# Patient Record
Sex: FEMALE | Race: WHITE | NOT HISPANIC OR LATINO | Employment: UNEMPLOYED | URBAN - METROPOLITAN AREA
[De-identification: names, ages, dates, MRNs, and addresses within clinical notes are randomized per-mention and may not be internally consistent; named-entity substitution may affect disease eponyms.]

---

## 2017-05-05 ENCOUNTER — TRANSFERRED RECORDS (OUTPATIENT)
Dept: HEALTH INFORMATION MANAGEMENT | Facility: CLINIC | Age: 22
End: 2017-05-05

## 2018-08-21 ENCOUNTER — TRANSFERRED RECORDS (OUTPATIENT)
Dept: HEALTH INFORMATION MANAGEMENT | Facility: CLINIC | Age: 23
End: 2018-08-21

## 2019-01-18 ENCOUNTER — TRANSFERRED RECORDS (OUTPATIENT)
Dept: HEALTH INFORMATION MANAGEMENT | Facility: CLINIC | Age: 24
End: 2019-01-18
Payer: OTHER GOVERNMENT

## 2019-08-26 ENCOUNTER — COMMUNICATION - HEALTHEAST (OUTPATIENT)
Dept: SCHEDULING | Facility: CLINIC | Age: 24
End: 2019-08-26

## 2020-01-03 ASSESSMENT — ENCOUNTER SYMPTOMS
WEIGHT LOSS: 0
NERVOUS/ANXIOUS: 1
COUGH: 0
DYSPNEA ON EXERTION: 0
FATIGUE: 0
RECTAL PAIN: 0
BOWEL INCONTINENCE: 0
SORE THROAT: 1
INCREASED ENERGY: 1
ALTERED TEMPERATURE REGULATION: 1
WEIGHT GAIN: 0
HALLUCINATIONS: 0
POOR WOUND HEALING: 0
TASTE DISTURBANCE: 1
SINUS PAIN: 0
NECK PAIN: 1
POLYPHAGIA: 0
ABDOMINAL PAIN: 1
MUSCLE WEAKNESS: 0
JAUNDICE: 0
CHILLS: 0
ARTHRALGIAS: 1
DEPRESSION: 1
DIARRHEA: 1
STIFFNESS: 1
DECREASED CONCENTRATION: 1
MYALGIAS: 1
POSTURAL DYSPNEA: 0
POLYDIPSIA: 0
DECREASED LIBIDO: 1
NAIL CHANGES: 0
BLOOD IN STOOL: 0
CONSTIPATION: 0
SMELL DISTURBANCE: 0
HEMOPTYSIS: 0
SPUTUM PRODUCTION: 0
INSOMNIA: 1
JOINT SWELLING: 0
FEVER: 1
NECK MASS: 0
NAUSEA: 1
BLOATING: 0
HOARSE VOICE: 0
COUGH DISTURBING SLEEP: 0
BACK PAIN: 1
PANIC: 0
HOT FLASHES: 0
SINUS CONGESTION: 1
SNORES LOUDLY: 0
DECREASED APPETITE: 0
WHEEZING: 0
TROUBLE SWALLOWING: 0
HEARTBURN: 0
SKIN CHANGES: 0
NIGHT SWEATS: 0
MUSCLE CRAMPS: 1
VOMITING: 0
SHORTNESS OF BREATH: 1

## 2020-01-03 ASSESSMENT — ANXIETY QUESTIONNAIRES
7. FEELING AFRAID AS IF SOMETHING AWFUL MIGHT HAPPEN: SEVERAL DAYS
4. TROUBLE RELAXING: MORE THAN HALF THE DAYS
GAD7 TOTAL SCORE: 13
GAD7 TOTAL SCORE: 13
5. BEING SO RESTLESS THAT IT IS HARD TO SIT STILL: NOT AT ALL
1. FEELING NERVOUS, ANXIOUS, OR ON EDGE: NEARLY EVERY DAY
3. WORRYING TOO MUCH ABOUT DIFFERENT THINGS: NEARLY EVERY DAY
7. FEELING AFRAID AS IF SOMETHING AWFUL MIGHT HAPPEN: SEVERAL DAYS
2. NOT BEING ABLE TO STOP OR CONTROL WORRYING: NEARLY EVERY DAY
6. BECOMING EASILY ANNOYED OR IRRITABLE: SEVERAL DAYS

## 2020-01-07 ENCOUNTER — OFFICE VISIT (OUTPATIENT)
Dept: OBGYN | Facility: CLINIC | Age: 25
End: 2020-01-07
Attending: NURSE PRACTITIONER
Payer: OTHER GOVERNMENT

## 2020-01-07 VITALS
WEIGHT: 98.4 LBS | DIASTOLIC BLOOD PRESSURE: 72 MMHG | BODY MASS INDEX: 18.58 KG/M2 | SYSTOLIC BLOOD PRESSURE: 112 MMHG | HEART RATE: 118 BPM | HEIGHT: 61 IN

## 2020-01-07 DIAGNOSIS — R10.2 VULVOVAGINAL PAIN: Primary | ICD-10-CM

## 2020-01-07 DIAGNOSIS — Z11.3 SCREEN FOR STD (SEXUALLY TRANSMITTED DISEASE): ICD-10-CM

## 2020-01-07 LAB
CLUE CELLS: NEGATIVE
TRICHOMONAS (WET PREP): NEGATIVE
YEAST (WET PREP): NEGATIVE

## 2020-01-07 PROCEDURE — G0463 HOSPITAL OUTPT CLINIC VISIT: HCPCS

## 2020-01-07 PROCEDURE — 87491 CHLMYD TRACH DNA AMP PROBE: CPT | Performed by: NURSE PRACTITIONER

## 2020-01-07 PROCEDURE — 87210 SMEAR WET MOUNT SALINE/INK: CPT | Mod: ZF | Performed by: NURSE PRACTITIONER

## 2020-01-07 PROCEDURE — 87591 N.GONORRHOEAE DNA AMP PROB: CPT | Performed by: NURSE PRACTITIONER

## 2020-01-07 RX ORDER — BUPROPION HYDROCHLORIDE 150 MG/1
TABLET, EXTENDED RELEASE ORAL
COMMUNITY
Start: 2018-06-06 | End: 2024-01-31

## 2020-01-07 RX ORDER — CLOBETASOL PROPIONATE 0.5 MG/G
OINTMENT TOPICAL DAILY
Qty: 30 G | Refills: 0 | Status: ON HOLD | OUTPATIENT
Start: 2020-01-07 | End: 2020-02-04

## 2020-01-07 ASSESSMENT — ANXIETY QUESTIONNAIRES
2. NOT BEING ABLE TO STOP OR CONTROL WORRYING: MORE THAN HALF THE DAYS
5. BEING SO RESTLESS THAT IT IS HARD TO SIT STILL: NOT AT ALL
GAD7 TOTAL SCORE: 7
6. BECOMING EASILY ANNOYED OR IRRITABLE: SEVERAL DAYS
3. WORRYING TOO MUCH ABOUT DIFFERENT THINGS: MORE THAN HALF THE DAYS
7. FEELING AFRAID AS IF SOMETHING AWFUL MIGHT HAPPEN: NOT AT ALL
1. FEELING NERVOUS, ANXIOUS, OR ON EDGE: SEVERAL DAYS

## 2020-01-07 ASSESSMENT — PATIENT HEALTH QUESTIONNAIRE - PHQ9
5. POOR APPETITE OR OVEREATING: SEVERAL DAYS
SUM OF ALL RESPONSES TO PHQ QUESTIONS 1-9: 8

## 2020-01-07 ASSESSMENT — PAIN SCALES - GENERAL: PAINLEVEL: NO PAIN (0)

## 2020-01-07 ASSESSMENT — MIFFLIN-ST. JEOR: SCORE: 1143.72

## 2020-01-07 NOTE — PROGRESS NOTES
"Gaviota Hoover is a 24 yr old female, , who presents to clinic today for evaluation of vaginal pain.     Gaviota has had vaginal pain, believes it was first noticed at age 16 or 17. Primarily occurs with intimacy/ when touched, but occasionally occurred   Randomly, especially when walking. Feels \"dry.\" Describes it as a sharp pain.  Noticed at the opening to the vagina. Not being experienced at this time.   Able to use tampons, able to insert 1 finger but this causes some discomfort. Can work up to insertion of 2. Feels like it doesn't stretch.  In the last 3 months, the random pains have been happening more frequently.     Has had a biospy done by previous provider who thought it may be lichen sclerosus; biopsy was inconclusive per patient report. Another provider thought it was muscular in nature. Recommended pelvic floor PT, but patient declines.     Has not been able to tolerate pelvic exam and pap smear.  Had been seeing Saray Posadas at [facility].     Has had BV and yeast in the past. Denies vaginal itching, abnormal vaginal discharge, vaginal odor, pelvic pain, urinary symptoms, and fever. No hx of STDs.     Menses:  - COCs, takes inactive pills every 3 months   - Hx of Dysmenorrhea and PMS symptoms which are improved with this      Recently graduated college with Criminal justice major; looking for jobs.       Answers for HPI/ROS submitted by the patient on 1/3/2020   CED 7 TOTAL SCORE: 13  General Symptoms: Yes  Skin Symptoms: Yes  HENT Symptoms: Yes  EYE SYMPTOMS: No  HEART SYMPTOMS: No  LUNG SYMPTOMS: Yes  INTESTINAL SYMPTOMS: Yes  URINARY SYMPTOMS: No  GYNECOLOGIC SYMPTOMS: Yes  BREAST SYMPTOMS: No  SKELETAL SYMPTOMS: Yes  BLOOD SYMPTOMS: No  NERVOUS SYSTEM SYMPTOMS: No  MENTAL HEALTH SYMPTOMS: Yes  Fever: Yes  Loss of appetite: No  Weight loss: No  Weight gain: No  Fatigue: No  Night sweats: No  Chills: No  Increased stress: Yes  Excessive hunger: No  Excessive thirst: No  Feeling hot or cold " when others believe the temperature is normal: Yes  Loss of height: No  Post-operative complications: No  Surgical site pain: No  Hallucinations: No  Change in or Loss of Energy: Yes  Hyperactivity: No  Confusion: No  Changes in hair: No  Changes in moles/birth marks: No  Itching: Yes  Rashes: No  Changes in nails: No  Acne: Yes  Hair in places you don't want it: No  Change in facial hair: No  Warts: No  Non-healing sores: No  Scarring: No  Flaking of skin: No  Color changes of hands/feet in cold : No  Sun sensitivity: No  Skin thickening: No  Ear pain: No  Ear discharge: No  Hearing loss: No  Tinnitus: No  Nosebleeds: No  Congestion: Yes  Sinus pain: No  Trouble swallowing: No   Voice hoarseness: No  Mouth sores: No  Sore throat: Yes  Tooth pain: No  Gum tenderness: No  Bleeding gums: No  Change in taste: Yes  Change in sense of smell: No  Dry mouth: No  Hearing aid used: No  Neck lump: No  Cough: No  Sputum or phlegm: No  Coughing up blood: No  Difficulty breating or shortness of breath: Yes  Snoring: No  Wheezing: No  Difficulty breathing on exertion: No  Nighttime Cough: No  Difficulty breathing when lying flat: No  Heart burn or indigestion: No  Nausea: Yes  Vomiting: No  Abdominal pain: Yes  Bloating: No  Constipation: No  Diarrhea: Yes  Blood in stool: No  Black stools: No  Rectal or Anal pain: No  Fecal incontinence: No  Yellowing of skin or eyes: No  Vomit with blood: No  Change in stools: No  Back pain: Yes  Muscle aches: Yes  Neck pain: Yes  Swollen joints: No  Joint pain: Yes  Bone pain: No  Muscle cramps: Yes  Muscle weakness: No  Joint stiffness: Yes  Bone fracture: No  Bleeding or spotting between periods: No  Heavy or painful periods: No  Irregular periods: No  Vaginal discharge: No  Hot flashes: No  Vaginal dryness: No  Genital ulcers: No  Reduced libido: Yes  Painful intercourse: Yes  Difficulty with sexual arousal: No  Post-menopausal bleeding: No  Nervous or Anxious: Yes  Depression: Yes  Trouble  sleeping: Yes  Trouble thinking or concentrating: Yes  Mood changes: Yes  Panic attacks: No    Wet prep: pH=4.4; neg for clue cells, trichomonas, and yeast; neg for amine odor    PLAN:  Pt to schedule consult with ObGyn for removal of remaining hymenal tissue.   May try clobetasol            never

## 2020-01-07 NOTE — LETTER
"2020       RE: Dina Hoover  [address] [facility] [address]     Dear Colleague,    Thank you for referring your patient, Dina Hoover, to the [facility] at [facility]. Please see a copy of my visit note below.    Gaviota Hoover is a 24 yr old female, , who presents to clinic today for evaluation of vaginal pain.     Gaviota has had vaginal pain, believes it was first noticed at age 16 or 17. Primarily occurs with intimacy/ when touched, but occasionally occurred   Randomly, especially when walking. Feels \"dry.\" Describes it as a sharp pain.  Noticed at the opening to the vagina. Not being experienced at this time.   Able to use tampons, able to insert 1 finger but this causes some discomfort. Can work up to insertion of 2. Feels like it doesn't stretch.  In the last 3 months, the random pains have been happening more frequently.     Has had a biospy done by previous provider who thought it may be lichen sclerosus; biopsy was inconclusive per patient report. Another provider thought it was muscular in nature. Recommended pelvic floor PT, but patient declines.     Has not been able to tolerate pelvic exam and pap smear.  Had been seeing Saray Posadas at [facility].     Has had BV and yeast in the past. Denies vaginal itching, abnormal vaginal discharge, vaginal odor, pelvic pain, urinary symptoms, and fever. No hx of STDs.     Menses:  - COCs, takes inactive pills every 3 months   - Hx of Dysmenorrhea and PMS symptoms which are improved with this    Recently graduated college with Criminal justice major; looking for jobs.     Answers for HPI/ROS submitted by the patient on 1/3/2020   CED 7 TOTAL SCORE: 13  General Symptoms: Yes  Skin Symptoms: Yes  HENT Symptoms: Yes  EYE SYMPTOMS: No  HEART SYMPTOMS: No  LUNG SYMPTOMS: Yes  INTESTINAL SYMPTOMS: Yes  URINARY SYMPTOMS: No  GYNECOLOGIC SYMPTOMS: Yes  BREAST SYMPTOMS: No  SKELETAL " SYMPTOMS: Yes  BLOOD SYMPTOMS: No  NERVOUS SYSTEM SYMPTOMS: No  MENTAL HEALTH SYMPTOMS: Yes  Fever: Yes  Loss of appetite: No  Weight loss: No  Weight gain: No  Fatigue: No  Night sweats: No  Chills: No  Increased stress: Yes  Excessive hunger: No  Excessive thirst: No  Feeling hot or cold when others believe the temperature is normal: Yes  Loss of height: No  Post-operative complications: No  Surgical site pain: No  Hallucinations: No  Change in or Loss of Energy: Yes  Hyperactivity: No  Confusion: No  Changes in hair: No  Changes in moles/birth marks: No  Itching: Yes  Rashes: No  Changes in nails: No  Acne: Yes  Hair in places you don't want it: No  Change in facial hair: No  Warts: No  Non-healing sores: No  Scarring: No  Flaking of skin: No  Color changes of hands/feet in cold : No  Sun sensitivity: No  Skin thickening: No  Ear pain: No  Ear discharge: No  Hearing loss: No  Tinnitus: No  Nosebleeds: No  Congestion: Yes  Sinus pain: No  Trouble swallowing: No   Voice hoarseness: No  Mouth sores: No  Sore throat: Yes  Tooth pain: No  Gum tenderness: No  Bleeding gums: No  Change in taste: Yes  Change in sense of smell: No  Dry mouth: No  Hearing aid used: No  Neck lump: No  Cough: No  Sputum or phlegm: No  Coughing up blood: No  Difficulty breating or shortness of breath: Yes  Snoring: No  Wheezing: No  Difficulty breathing on exertion: No  Nighttime Cough: No  Difficulty breathing when lying flat: No  Heart burn or indigestion: No  Nausea: Yes  Vomiting: No  Abdominal pain: Yes  Bloating: No  Constipation: No  Diarrhea: Yes  Blood in stool: No  Black stools: No  Rectal or Anal pain: No  Fecal incontinence: No  Yellowing of skin or eyes: No  Vomit with blood: No  Change in stools: No  Back pain: Yes  Muscle aches: Yes  Neck pain: Yes  Swollen joints: No  Joint pain: Yes  Bone pain: No  Muscle cramps: Yes  Muscle weakness: No  Joint stiffness: Yes  Bone fracture: No  Bleeding or spotting between periods: No  Heavy  or painful periods: No  Irregular periods: No  Vaginal discharge: No  Hot flashes: No  Vaginal dryness: No  Genital ulcers: No  Reduced libido: Yes  Painful intercourse: Yes  Difficulty with sexual arousal: No  Post-menopausal bleeding: No  Nervous or Anxious: Yes  Depression: Yes  Trouble sleeping: Yes  Trouble thinking or concentrating: Yes  Mood changes: Yes  Panic attacks: No    Wet prep: pH=4.4; neg for clue cells, trichomonas, and yeast; neg for amine odor    PLAN:  Pt to schedule consult with ObGyn for removal of remaining hymenal tissue.   May try clobetasol     JASON Hernandez CNP

## 2020-01-08 LAB
C TRACH DNA SPEC QL NAA+PROBE: NEGATIVE
N GONORRHOEA DNA SPEC QL NAA+PROBE: NEGATIVE
SPECIMEN SOURCE: NORMAL
SPECIMEN SOURCE: NORMAL

## 2020-01-28 ENCOUNTER — OFFICE VISIT (OUTPATIENT)
Dept: OBGYN | Facility: CLINIC | Age: 25
End: 2020-01-28
Attending: NURSE PRACTITIONER
Payer: OTHER GOVERNMENT

## 2020-01-28 VITALS
HEART RATE: 85 BPM | DIASTOLIC BLOOD PRESSURE: 84 MMHG | HEIGHT: 61 IN | SYSTOLIC BLOOD PRESSURE: 115 MMHG | WEIGHT: 97.1 LBS | BODY MASS INDEX: 18.33 KG/M2

## 2020-01-28 DIAGNOSIS — E28.39 HYPOESTROGENISM: ICD-10-CM

## 2020-01-28 DIAGNOSIS — R10.2 PELVIC PAIN IN FEMALE: Primary | ICD-10-CM

## 2020-01-28 DIAGNOSIS — Q52.4 HYMEN ABNORMALITY: ICD-10-CM

## 2020-01-28 PROCEDURE — G0463 HOSPITAL OUTPT CLINIC VISIT: HCPCS | Mod: ZF

## 2020-01-28 ASSESSMENT — ANXIETY QUESTIONNAIRES
6. BECOMING EASILY ANNOYED OR IRRITABLE: NOT AT ALL
5. BEING SO RESTLESS THAT IT IS HARD TO SIT STILL: NOT AT ALL
GAD7 TOTAL SCORE: 3
3. WORRYING TOO MUCH ABOUT DIFFERENT THINGS: SEVERAL DAYS
2. NOT BEING ABLE TO STOP OR CONTROL WORRYING: SEVERAL DAYS
7. FEELING AFRAID AS IF SOMETHING AWFUL MIGHT HAPPEN: NOT AT ALL
1. FEELING NERVOUS, ANXIOUS, OR ON EDGE: SEVERAL DAYS

## 2020-01-28 ASSESSMENT — MIFFLIN-ST. JEOR: SCORE: 1137.82

## 2020-01-28 ASSESSMENT — PATIENT HEALTH QUESTIONNAIRE - PHQ9
5. POOR APPETITE OR OVEREATING: NOT AT ALL
SUM OF ALL RESPONSES TO PHQ QUESTIONS 1-9: 7

## 2020-01-28 NOTE — PROGRESS NOTES
"[facility]  Gynecology Consult Visit    SUBJECTIVE    Dina Hoover is a 24 year old G0 who is here for vulvar pain. Gaviota notes that she's had pain since shortly after menarche (age 15; periods became regular and monthly at age 16). Initially, she noted pain and difficulty with insertion of finger/tampon. At age 16, she also began taking OCPs for painful periods and mood swings. Around age 17, she also began having a rubbing or chafing sensation when she walks at the entrance to the vagina. She denies pain inside the vagina, even with insertion of a tampon, only pain at the entrance of the vagina. It is for this reason that she thinks something wrong with her hymen. Gaviota feels that it is physical or a piece of tissue that rubs, or something physically stopping insertion, rather than a muscular problem. At age 19, Gaviota began to think that this pain wasn't normal, but didn't seek care until age 22. [facility] [place] Initially she was treated for BV, but symptoms continued, so she had a vulvar biopsy, and was told she had lichen sclerosus (though the biopsy was negative). She then went to [facility], and there, was unable to tolerate a pelvic exam. At that time, [facility] was referred to pelvic floor physical therapy, but Gaviota didn't think that would help her, so she never went.     On 1/7/2020, she saw RONIT Borden. At that time, the plan was prescribed clobetasol cream and was recommended to see an OBGYN to discuss hymenectomy.     Gaviota continues to take OCPs to improve her periods. Her pain has been significantly improved, though she would still describe her cramps as \"moderate.\" Takes inactive pills q3mths and has about 7d of bleeding. No intermenstrual bleeding. Normal vaginal discharge, no odor, no itching.      PAST MEDICAL HISTORY  Past Medical History:   Diagnosis Date     Back pain      Depression      Generalized anxiety disorder      PAST " SURGICAL HISTORY  Past Surgical History:   Procedure Laterality Date     ENT SURGERY      tooth procedure       MEDICATIONS  Current Outpatient Medications   Medication     buPROPion (WELLBUTRIN SR) 150 MG 12 hr tablet     cetirizine HCl 10 MG CAPS     cholecalciferol (VITAMIN D) 200 units (5 mcg) TABS half-tab     melatonin 5 MG tablet     norethindrone-ethinyl estradiol (ORTHO-NOVUM 1-35 TAB,NORTREL 1-35 TAB) 1-35 MG-MCG tablet     No current facility-administered medications for this visit.        ALLERGIES  Allergies   Allergen Reactions     Cats Itching     Dogs Itching     Grass Itching     Pineapple Itching     OBGYN HISTORY  G0  No history of STDs. Never had a pap smear as she has been unable to tolerate pelvic exams.   Menarche at age 15, periods regular but painful and heavy prior to OCP use. Now managed well with OCPs.    SOCIAL HISTORY  Social History     Tobacco Use     Smoking status: Never Smoker     Smokeless tobacco: Never Used   Substance Use Topics     Alcohol use: Not Currently     Comment: I'll have a drink maybe once or twice a year     Drug use: Never   Recently graduated college with Criminal justice major; looking for jobs. Present at visit today with her girlfriend.    FAMILY HISTORY    Family History   Problem Relation Age of Onset     Other Cancer Maternal Grandfather         Some kind of abdominal cancer was suspected when he . He was a heavy smoker and alcoholic.     Substance Abuse Maternal Grandfather         alcohol     Anxiety Disorder Sister         social anxiety     Anxiety Disorder Sister         CED     Depression Sister      Depression Mother      Genetic Disorder Mother         Anais-Danlos syndrome (hyperflexibility)     Breast Cancer No family hx of      Ovarian Cancer No family hx of      Colon Cancer No family hx of    States that her mom had a radical hysterectomy because of large, bilateral ovarian cysts; surgeon was concerned for cancer at time of procedure;  "pathology ultimately benign.     REVIEW OF SYSTEMS  A 10 point review of systems including Constitutional, Eyes, Respiratory, Cardiovascular, Gastroenterology, Genitourinary, Integumentary, Musculoskeletal, and Psychiatric, were all negative, except for pertinent positives noted in the above HPI.    OBJECTIVE  /84   Pulse 85   Ht 1.549 m (5' 1\")   Wt 44 kg (97 lb 1.6 oz)   BMI 18.35 kg/m      General: Alert, without distress  HEENT: normocephalic, without obvious abnormality   Cardiovascular: extremities warm and well-perfused   Lungs: normal work of breathing   Abdomen: soft, non-tender, non-distended   Pelvic: external genitalia with erythema and excoriation of the labia majora and minora. Very narrow introitus with excess hymenal tissue posteriorly; it is beefy red and atrophic appearing. No weeping/exudate from hymen. Using smallest pediatric roderick speculum, able to gently see anterior lip of cervix, which was normal and without lesions. Using 1 finger, I was able to perform a gentle bimanual examination. Introitus narrowed, pelvic floor very tight, tenderness of introitus/hymen but no tenderness on palpation of anterior/lateral/posterior vagina or cervix. Uterus small, anteverted, nontender adnexae without masses. Normal anus/perineum.   Extremities: normal    LABS:     Component      Latest Ref Rng & Units 1/7/2020   Trichomonas Wet Prep      Negative Negative   Clue cells      Negative Negative   Yeast Wet Prep      Negative Negative   Specimen Descrip       Vagina   N Gonorrhea PCR      NEG:Negative Negative   Specimen Description       Vagina   Chlamydia Trachomatis PCR      NEG:Negative Negative     IMAGING:    ASSESSMENT  Dina Hoover is a 24 year old G0 who is here with vaginal pain.    PLAN  Gaviota and I discussed that I think her pain has several causes:    -Gaviota's exam today is without evidence of a uterine/mullerian anomaly, but I discussed with Gaviota that her pain precludes a " thorough exam of the vagina. I recommended an abdominal pelvic ultrasound to further exclude this, as this would drastically change our management of her pain. She is in agreement.  -I believe her long-standing OCP use has led to some vaginal atrophy. I discussed that we could either switch methods to something non-systemic, like a Mirena IUD, or she could use topical estrogen cream. Gaviota would like to avoid an IUD if possible as she is worried about infertility (though she understands malposition/perforation is an unlikely outcome), so would like to proceed with vaginal estrogen cream. Reviewed how/where to apply and how often.  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 Discussed performing an exam under anesthesia, pap smear, and hymenectomy. Gaviota is in agreement. Discussed risks include incomplete resolution or worsening of pain, narrowing of the vagina after the procedure. To be scheduled at her earliest convenience.  -Finally, discussed that Gaviota has a very hypertonic pelvic floor, likely because of her long-standing pain. I discussed that because of this, hymenectomy and vaginal estrogen are unlikely to completely resolve her symptoms, and that I think pelvic floor PT is likely to help, possibly also with use of vaginal dilators. Gaviota is cautiously in agreement. We will continue to discuss in the post-operative period as she recovers from surgery.     RTC for 2 week postop.    Evie Ludwig MD, MSCI    [facility]/OB[facility]N

## 2020-01-28 NOTE — LETTER
1/28/2020       RE: Dina Hoover  [address] [place] [id number]     Dear Colleague,    Thank you for referring your patient, Dina Hoover, to the [facility] at [facility]. Please see a copy of my visit note below.    [facility]  Gynecology Consult Visit    SUBJECTIVE    Dina Hoover is a 24 year old G0 who is here for vulvar pain. Gaviota notes that she's had pain since shortly after menarche (age 15; periods became regular and monthly at age 16). Initially, she noted pain and difficulty with insertion of finger/tampon. At age 16, she also began taking OCPs for painful periods and mood swings. Around age 17, she also began having a rubbing or chafing sensation when she walks at the entrance to the vagina. She denies pain inside the vagina, even with insertion of a tampon, only pain at the entrance of the vagina. It is for this reason that she thinks something wrong with her hymen. Gaviota feels that it is physical or a piece of tissue that rubs, or something physically stopping insertion, rather than a muscular problem. At age 19, Gaviota began to think that this pain wasn't normal, but didn't seek care until age 22. [facility] [place] Initially she was treated for BV, but symptoms continued, so she had a vulvar biopsy, and was told she had lichen sclerosus (though the biopsy was negative). She then went to [facility], and there, was unable to tolerate a pelvic exam. At that time, [facility] was referred to pelvic floor physical therapy, but Gaviota didn't think that would help her, so she never went.     On 1/7/2020, she saw RONIT Borden. At that time, the plan was prescribed clobetasol cream and was recommended to see an OBGYN to discuss hymenectomy.     Gaviota continues to take OCPs to improve her periods. Her pain has been significantly improved, though she would still describe her  "cramps as \"moderate.\" Takes inactive pills q3mths and has about 7d of bleeding. No intermenstrual bleeding. Normal vaginal discharge, no odor, no itching.      PAST MEDICAL HISTORY  Past Medical History:   Diagnosis Date     Back pain      Depression      Generalized anxiety disorder      PAST SURGICAL HISTORY  Past Surgical History:   Procedure Laterality Date     ENT SURGERY      tooth procedure       MEDICATIONS  Current Outpatient Medications   Medication     buPROPion (WELLBUTRIN SR) 150 MG 12 hr tablet     cetirizine HCl 10 MG CAPS     cholecalciferol (VITAMIN D) 200 units (5 mcg) TABS half-tab     melatonin 5 MG tablet     norethindrone-ethinyl estradiol (ORTHO-NOVUM 1-35 TAB,NORTREL 1-35 TAB) 1-35 MG-MCG tablet     No current facility-administered medications for this visit.        ALLERGIES  Allergies   Allergen Reactions     Cats Itching     Dogs Itching     Grass Itching     Pineapple Itching     OBGYN HISTORY  G0  No history of STDs. Never had a pap smear as she has been unable to tolerate pelvic exams.   Menarche at age 15, periods regular but painful and heavy prior to OCP use. Now managed well with OCPs.    SOCIAL HISTORY  Social History     Tobacco Use     Smoking status: Never Smoker     Smokeless tobacco: Never Used   Substance Use Topics     Alcohol use: Not Currently     Comment: I'll have a drink maybe once or twice a year     Drug use: Never   Recently graduated college with Criminal justice major; looking for jobs. Present at visit today with her girlfriend.    FAMILY HISTORY    Family History   Problem Relation Age of Onset     Other Cancer Maternal Grandfather         Some kind of abdominal cancer was suspected when he . He was a heavy smoker and alcoholic.     Substance Abuse Maternal Grandfather         alcohol     Anxiety Disorder Sister         social anxiety     Anxiety Disorder Sister         CED     Depression Sister      Depression Mother      Genetic Disorder Mother         " "Anais-Danlos syndrome (hyperflexibility)     Breast Cancer No family hx of      Ovarian Cancer No family hx of      Colon Cancer No family hx of    States that her mom had a radical hysterectomy because of large, bilateral ovarian cysts; surgeon was concerned for cancer at time of procedure; pathology ultimately benign.     REVIEW OF SYSTEMS  A 10 point review of systems including Constitutional, Eyes, Respiratory, Cardiovascular, Gastroenterology, Genitourinary, Integumentary, Musculoskeletal, and Psychiatric, were all negative, except for pertinent positives noted in the above HPI.    OBJECTIVE  /84   Pulse 85   Ht 1.549 m (5' 1\")   Wt 44 kg (97 lb 1.6 oz)   BMI 18.35 kg/m       General: Alert, without distress  HEENT: normocephalic, without obvious abnormality   Cardiovascular: extremities warm and well-perfused   Lungs: normal work of breathing   Abdomen: soft, non-tender, non-distended   Pelvic: external genitalia with erythema and excoriation of the labia majora and minora. Very narrow introitus with excess hymenal tissue posteriorly; it is beefy red and atrophic appearing. No weeping/exudate from hymen. Using smallest pediatric roderick speculum, able to gently see anterior lip of cervix, which was normal and without lesions. Using 1 finger, I was able to perform a gentle bimanual examination. Introitus narrowed, pelvic floor very tight, tenderness of introitus/hymen but no tenderness on palpation of anterior/lateral/posterior vagina or cervix. Uterus small, anteverted, nontender adnexae without masses. Normal anus/perineum.   Extremities: normal    LABS:     Component      Latest Ref Rng & Units 1/7/2020   Trichomonas Wet Prep      Negative Negative   Clue cells      Negative Negative   Yeast Wet Prep      Negative Negative   Specimen Descrip       Vagina   N Gonorrhea PCR      NEG:Negative Negative   Specimen Description       Vagina   Chlamydia Trachomatis PCR      NEG:Negative Negative "     IMAGING:    ASSESSMENT  Dina Hoover is a 24 year old G0 who is here with vaginal pain.    PLAN  Gaviota and I discussed that I think her pain has several causes:    -Gaviota's exam today is without evidence of a uterine/mullerian anomaly, but I discussed with Gaviota that her pain precludes a thorough exam of the vagina. I recommended an abdominal pelvic ultrasound to further exclude this, as this would drastically change our management of her pain. She is in agreement.  -I believe her long-standing OCP use has led to some vaginal atrophy. I discussed that we could either switch methods to something non-systemic, like a Mirena IUD, or she could use topical estrogen cream. Gaviota would like to avoid an IUD if possible as she is worried about infertility (though she understands malposition/perforation is an unlikely outcome), so would like to proceed with vaginal estrogen cream. Reviewed how/where to apply and how often.  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 Discussed performing an exam under anesthesia, pap smear, and hymenectomy. Gaviota is in agreement. Discussed risks include incomplete resolution or worsening of pain, narrowing of the vagina after the procedure. To be scheduled at her earliest convenience.  -Finally, discussed that Gaviota has a very hypertonic pelvic floor, likely because of her long-standing pain. I discussed that because of this, hymenectomy and vaginal estrogen are unlikely to completely resolve her symptoms, and that I think pelvic floor PT is likely to help, possibly also with use of vaginal dilators. Gaviota is cautiously in agreement. We will continue to discuss in the post-operative period as she recovers from surgery.     RTC for 2 week postop.    Evie Ludwig MD, MSCI    [facility]

## 2020-01-29 ENCOUNTER — TELEPHONE (OUTPATIENT)
Dept: OBGYN | Facility: CLINIC | Age: 25
End: 2020-01-29

## 2020-01-29 PROBLEM — Q52.4 HYMEN ABNORMALITY: Status: ACTIVE | Noted: 2020-01-29

## 2020-01-29 PROBLEM — E28.39 HYPOESTROGENISM: Status: ACTIVE | Noted: 2020-01-29

## 2020-01-29 PROBLEM — R10.2 PELVIC PAIN IN FEMALE: Status: ACTIVE | Noted: 2020-01-29

## 2020-01-29 ASSESSMENT — ANXIETY QUESTIONNAIRES: GAD7 TOTAL SCORE: 3

## 2020-01-29 NOTE — TELEPHONE ENCOUNTER
Confirmed surgery date, time and location, 2/4/20 with arrival time at 11:00a.m with nothing to eat eight hours before scheduled surgery time and clear liquids up to two hours before scheduled surgery time, informed patient that a surgery map and letter will be at the  for appt scheduled 1/29/20.     to complete the following fields:            CHECKLIST     Google Calendar : Yes     Resident notified: Not Applicable     Clinic schedule blocked:  Not Applicable    Patient notified:Yes      Pre op information sent: Yes     Given to patient over the phone.Yes    Comments:

## 2020-01-30 ENCOUNTER — TELEPHONE (OUTPATIENT)
Dept: OBGYN | Facility: CLINIC | Age: 25
End: 2020-01-30

## 2020-01-30 ENCOUNTER — ANCILLARY PROCEDURE (OUTPATIENT)
Dept: ULTRASOUND IMAGING | Facility: CLINIC | Age: 25
End: 2020-01-30
Attending: OBSTETRICS & GYNECOLOGY
Payer: OTHER GOVERNMENT

## 2020-01-30 DIAGNOSIS — R10.2 PELVIC PAIN IN FEMALE: ICD-10-CM

## 2020-01-30 DIAGNOSIS — E28.39 HYPOESTROGENISM: ICD-10-CM

## 2020-01-30 PROCEDURE — 76856 US EXAM PELVIC COMPLETE: CPT

## 2020-01-30 NOTE — TELEPHONE ENCOUNTER
----- Message from Tanna Adair RN sent at 1/29/2020  5:08 PM CST -----  Regarding: [facility]   Pt requesting to [facility]

## 2020-02-04 ENCOUNTER — ANESTHESIA (OUTPATIENT)
Dept: SURGERY | Facility: CLINIC | Age: 25
End: 2020-02-04
Payer: OTHER GOVERNMENT

## 2020-02-04 ENCOUNTER — ANESTHESIA EVENT (OUTPATIENT)
Dept: SURGERY | Facility: CLINIC | Age: 25
End: 2020-02-04
Payer: OTHER GOVERNMENT

## 2020-02-04 ENCOUNTER — HOSPITAL ENCOUNTER (OUTPATIENT)
Facility: CLINIC | Age: 25
Discharge: HOME | End: 2020-02-04
Attending: OBSTETRICS & GYNECOLOGY | Admitting: OBSTETRICS & GYNECOLOGY
Payer: OTHER GOVERNMENT

## 2020-02-04 VITALS
DIASTOLIC BLOOD PRESSURE: 83 MMHG | BODY MASS INDEX: 18.91 KG/M2 | OXYGEN SATURATION: 100 % | SYSTOLIC BLOOD PRESSURE: 120 MMHG | TEMPERATURE: 98.6 F | HEIGHT: 60 IN | RESPIRATION RATE: 20 BRPM | HEART RATE: 123 BPM | WEIGHT: 96.34 LBS

## 2020-02-04 DIAGNOSIS — R10.2 VULVOVAGINAL PAIN: ICD-10-CM

## 2020-02-04 DIAGNOSIS — E28.39 HYPOESTROGENISM: ICD-10-CM

## 2020-02-04 DIAGNOSIS — Q52.4 HYMEN ABNORMALITY: ICD-10-CM

## 2020-02-04 DIAGNOSIS — R10.2 PELVIC PAIN IN FEMALE: ICD-10-CM

## 2020-02-04 LAB
B-HCG SERPL-ACNC: <1 IU/L (ref 0–5)
GLUCOSE BLDC GLUCOMTR-MCNC: 83 MG/DL (ref 70–99)
HCG UR QL: NEGATIVE

## 2020-02-04 PROCEDURE — 25000132 ZZH RX MED GY IP 250 OP 250 PS 637: Performed by: STUDENT IN AN ORGANIZED HEALTH CARE EDUCATION/TRAINING PROGRAM

## 2020-02-04 PROCEDURE — 25000128 H RX IP 250 OP 636: Performed by: NURSE ANESTHETIST, CERTIFIED REGISTERED

## 2020-02-04 PROCEDURE — 71000027 ZZH RECOVERY PHASE 2 EACH 15 MINS: Performed by: OBSTETRICS & GYNECOLOGY

## 2020-02-04 PROCEDURE — G0123 SCREEN CERV/VAG THIN LAYER: HCPCS | Performed by: OBSTETRICS & GYNECOLOGY

## 2020-02-04 PROCEDURE — 36000053 ZZH SURGERY LEVEL 2 EA 15 ADDTL MIN - UMMC: Performed by: OBSTETRICS & GYNECOLOGY

## 2020-02-04 PROCEDURE — 82962 GLUCOSE BLOOD TEST: CPT

## 2020-02-04 PROCEDURE — 88305 TISSUE EXAM BY PATHOLOGIST: CPT | Mod: 26 | Performed by: OBSTETRICS & GYNECOLOGY

## 2020-02-04 PROCEDURE — 36000051 ZZH SURGERY LEVEL 2 1ST 30 MIN - UMMC: Performed by: OBSTETRICS & GYNECOLOGY

## 2020-02-04 PROCEDURE — 84702 CHORIONIC GONADOTROPIN TEST: CPT | Performed by: STUDENT IN AN ORGANIZED HEALTH CARE EDUCATION/TRAINING PROGRAM

## 2020-02-04 PROCEDURE — 37000009 ZZH ANESTHESIA TECHNICAL FEE, EACH ADDTL 15 MIN: Performed by: OBSTETRICS & GYNECOLOGY

## 2020-02-04 PROCEDURE — 40000170 ZZH STATISTIC PRE-PROCEDURE ASSESSMENT II: Performed by: OBSTETRICS & GYNECOLOGY

## 2020-02-04 PROCEDURE — 25000125 ZZHC RX 250: Performed by: NURSE ANESTHETIST, CERTIFIED REGISTERED

## 2020-02-04 PROCEDURE — 27210794 ZZH OR GENERAL SUPPLY STERILE: Performed by: OBSTETRICS & GYNECOLOGY

## 2020-02-04 PROCEDURE — 88305 TISSUE EXAM BY PATHOLOGIST: CPT | Performed by: OBSTETRICS & GYNECOLOGY

## 2020-02-04 PROCEDURE — 81025 URINE PREGNANCY TEST: CPT | Performed by: ANESTHESIOLOGY

## 2020-02-04 PROCEDURE — 37000008 ZZH ANESTHESIA TECHNICAL FEE, 1ST 30 MIN: Performed by: OBSTETRICS & GYNECOLOGY

## 2020-02-04 PROCEDURE — 36415 COLL VENOUS BLD VENIPUNCTURE: CPT | Performed by: STUDENT IN AN ORGANIZED HEALTH CARE EDUCATION/TRAINING PROGRAM

## 2020-02-04 PROCEDURE — 25800030 ZZH RX IP 258 OP 636: Performed by: ANESTHESIOLOGY

## 2020-02-04 RX ORDER — SODIUM CHLORIDE, SODIUM LACTATE, POTASSIUM CHLORIDE, CALCIUM CHLORIDE 600; 310; 30; 20 MG/100ML; MG/100ML; MG/100ML; MG/100ML
INJECTION, SOLUTION INTRAVENOUS CONTINUOUS
Status: DISCONTINUED | OUTPATIENT
Start: 2020-02-04 | End: 2020-02-04 | Stop reason: HOSPADM

## 2020-02-04 RX ORDER — ONDANSETRON 2 MG/ML
INJECTION INTRAMUSCULAR; INTRAVENOUS PRN
Status: DISCONTINUED | OUTPATIENT
Start: 2020-02-04 | End: 2020-02-04

## 2020-02-04 RX ORDER — LIDOCAINE 40 MG/G
CREAM TOPICAL
Status: DISCONTINUED | OUTPATIENT
Start: 2020-02-04 | End: 2020-02-04 | Stop reason: HOSPADM

## 2020-02-04 RX ORDER — PROPOFOL 10 MG/ML
INJECTION, EMULSION INTRAVENOUS CONTINUOUS PRN
Status: DISCONTINUED | OUTPATIENT
Start: 2020-02-04 | End: 2020-02-04

## 2020-02-04 RX ORDER — FENTANYL CITRATE 50 UG/ML
INJECTION, SOLUTION INTRAMUSCULAR; INTRAVENOUS PRN
Status: DISCONTINUED | OUTPATIENT
Start: 2020-02-04 | End: 2020-02-04

## 2020-02-04 RX ORDER — DEXAMETHASONE SODIUM PHOSPHATE 4 MG/ML
INJECTION, SOLUTION INTRA-ARTICULAR; INTRALESIONAL; INTRAMUSCULAR; INTRAVENOUS; SOFT TISSUE PRN
Status: DISCONTINUED | OUTPATIENT
Start: 2020-02-04 | End: 2020-02-04

## 2020-02-04 RX ORDER — LIDOCAINE HYDROCHLORIDE 20 MG/ML
INJECTION, SOLUTION INFILTRATION; PERINEURAL PRN
Status: DISCONTINUED | OUTPATIENT
Start: 2020-02-04 | End: 2020-02-04

## 2020-02-04 RX ORDER — PHENAZOPYRIDINE HYDROCHLORIDE 200 MG/1
200 TABLET, FILM COATED ORAL ONCE
Status: DISCONTINUED | OUTPATIENT
Start: 2020-02-04 | End: 2020-02-04

## 2020-02-04 RX ORDER — IBUPROFEN 600 MG/1
600 TABLET, FILM COATED ORAL
Status: COMPLETED | OUTPATIENT
Start: 2020-02-04 | End: 2020-02-04

## 2020-02-04 RX ADMIN — FENTANYL CITRATE 50 MCG: 50 INJECTION, SOLUTION INTRAMUSCULAR; INTRAVENOUS at 13:37

## 2020-02-04 RX ADMIN — SODIUM CHLORIDE, POTASSIUM CHLORIDE, SODIUM LACTATE AND CALCIUM CHLORIDE: 600; 310; 30; 20 INJECTION, SOLUTION INTRAVENOUS at 13:31

## 2020-02-04 RX ADMIN — ONDANSETRON 4 MG: 2 INJECTION INTRAMUSCULAR; INTRAVENOUS at 13:40

## 2020-02-04 RX ADMIN — FENTANYL CITRATE 50 MCG: 50 INJECTION, SOLUTION INTRAMUSCULAR; INTRAVENOUS at 13:33

## 2020-02-04 RX ADMIN — LIDOCAINE HYDROCHLORIDE 50 MG: 20 INJECTION, SOLUTION INFILTRATION; PERINEURAL at 13:35

## 2020-02-04 RX ADMIN — MIDAZOLAM 2 MG: 1 INJECTION INTRAMUSCULAR; INTRAVENOUS at 13:31

## 2020-02-04 RX ADMIN — PROPOFOL 150 MCG/KG/MIN: 10 INJECTION, EMULSION INTRAVENOUS at 13:33

## 2020-02-04 RX ADMIN — IBUPROFEN 600 MG: 600 TABLET ORAL at 15:15

## 2020-02-04 RX ADMIN — DEXAMETHASONE SODIUM PHOSPHATE 8 MG: 4 INJECTION, SOLUTION INTRAMUSCULAR; INTRAVENOUS at 13:40

## 2020-02-04 ASSESSMENT — MIFFLIN-ST. JEOR: SCORE: 1118.5

## 2020-02-04 NOTE — PROGRESS NOTES
Reviewed briefly risks/benefits of procedure with Gaviota. Questions answered. Plan for hymenectomy, exam under anesthesia, pap smear.     Evie Ludwig MD, MSCI    [facility]/OBGYN

## 2020-02-04 NOTE — ANESTHESIA POSTPROCEDURE EVALUATION
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       Anesthesia Type:  Not documented  MAC    Reportable Event: NO     PAIN: Uncomplicated   Sign Out status: Comfortable, Well controlled pain     PONV: No PONV   Sign Out status:  No Nausea or Vomiting     Neuro/Psych: Uneventful perioperative course   Sign Out Status: Preoperative baseline; Age appropriate mentation     Airway/Resp.: Uneventful perioperative course   Sign Out Status: Non labored breathing, age appropriate RR; Resp. Status within EXPECTED Parameters     CV: Uneventful perioperative course   Sign Out status: Appropriate BP and perfusion indices; Appropriate HR/Rhythm     Disposition:   Sign Out in:  PACU  Disposition:  Phase II; Home  Recovery Course: Uneventful  Follow-Up: Not required           Last Anesthesia Record Vitals:  CRNA VITALS  2020 1334 - 2020 1434      2020             Pulse:  82    SpO2:  100 %          Last PACU Vitals:  Vitals Value Taken Time   /72 2020  2:06 PM   Temp 36.9  C (98.4  F) 2020  2:06 PM   Pulse 92 2020  2:06 PM   Resp 20 2020  2:06 PM   SpO2 100 % 2020  2:06 PM   Temp src     NIBP 96/61 2020  2:01 PM   Pulse 82 2020  2:03 PM   SpO2 100 % 2020  2:03 PM   Resp     Temp     Ht Rate 90 2020  2:01 PM   Temp 2 37  C (98.6  F) 2020  2:00 PM         Electronically Signed By: Tacho Alcala DO, 2020, 2:48 PM

## 2020-02-04 NOTE — BRIEF OP NOTE
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 EUA: narrow vagina with posterior hymenal remant, small cervix, small mispositioned uterus  2. Hemostasis at end of case  Complications: None.    Debbi Abraham MD  Obstetrics and Gynecology, PGY-1  02/04/20. 2:05 PM

## 2020-02-04 NOTE — H&P
"[facility]  Gynecology Consult Visit    SUBJECTIVE    Dina Hoover is a 24 year old G0 who is here for vulvar pain. Gaviota notes that she's had pain since shortly after menarche (age 15; periods became regular and monthly at age 16). Initially, she noted pain and difficulty with insertion of finger/tampon. At age 16, she also began taking OCPs for painful periods and mood swings. Around age 17, she also began having a rubbing or chafing sensation when she walks at the entrance to the vagina. She denies pain inside the vagina, even with insertion of a tampon, only pain at the entrance of the vagina. It is for this reason that she thinks something wrong with her hymen. Gaviota feels that it is physical or a piece of tissue that rubs, or something physically stopping insertion, rather than a muscular problem. At age 19, Gaviota began to think that this pain wasn't normal, but didn't seek care until age 22. [facility] [place] Initially she was treated for BV, but symptoms continued, so she had a vulvar biopsy, and was told she had lichen sclerosus (though the biopsy was negative). She then went to [facility], and there, was unable to tolerate a pelvic exam. At that time, [facility] was referred to pelvic floor physical therapy, but Gaviota didn't think that would help her, so she never went.      On 1/7/2020, she saw RONIT Borden. At that time, the plan was prescribed clobetasol cream and was recommended to see an OBGYN to discuss hymenectomy.      Gaviota continues to take OCPs to improve her periods. Her pain has been significantly improved, though she would still describe her cramps as \"moderate.\" Takes inactive pills q3mths and has about 7d of bleeding. No intermenstrual bleeding. Normal vaginal discharge, no odor, no itching.    PAST MEDICAL HISTORY  Past Medical History:   Diagnosis Date     Back pain      Depression      Generalized anxiety disorder  "       MEDICATIONS    Medications Prior to Admission   Medication Sig Dispense Refill Last Dose     buPROPion (WELLBUTRIN SR) 150 MG 12 hr tablet    2020 at Unknown time     cetirizine HCl 10 MG CAPS    2020 at Unknown time     cholecalciferol (VITAMIN D) 200 units (5 mcg) TABS half-tab    2020 at Unknown time     [] clobetasol (TEMOVATE) 0.05 % external ointment Apply topically daily for 14 days 30 g 0      conjugated estrogens (PREMARIN) 0.625 MG/GM vaginal cream Place 1 g vaginally twice a week 50 g 1      melatonin 5 MG tablet    Taking     norethindrone-ethinyl estradiol (ORTHO-NOVUM 1-35 TAB,NORTREL 1-35 TAB) 1-35 MG-MCG tablet    Taking       ALLERGIES  Allergies   Allergen Reactions     Cats Itching     Dogs Itching     Grass Itching     Pineapple Itching       SOCIAL HISTORY  Social History     Tobacco Use     Smoking status: Never Smoker     Smokeless tobacco: Never Used   Substance Use Topics     Alcohol use: Not Currently     Comment: I'll have a drink maybe once or twice a year     Drug use: Never     OBGYN HISTORY  G0  No history of STDs. Never had a pap smear as she has been unable to tolerate pelvic exams.   Menarche at age 15, periods regular but painful and heavy prior to OCP use. Now managed well with OCPs.    FAMILY HISTORY  Family History   Problem Relation Age of Onset     Other Cancer Maternal Grandfather         Some kind of abdominal cancer was suspected when he . He was a heavy smoker and alcoholic.     Substance Abuse Maternal Grandfather         alcohol     Anxiety Disorder Sister         social anxiety     Anxiety Disorder Sister         CED     Depression Sister      Depression Mother      Genetic Disorder Mother         Anais-Danlos syndrome (hyperflexibility)     Breast Cancer No family hx of      Ovarian Cancer No family hx of      Colon Cancer No family hx of    States that her mom had a radical hysterectomy because of large, bilateral ovarian cysts;  surgeon was concerned for cancer at time of procedure; pathology ultimately benign.     REVIEW OF SYSTEMS  A 10 point review of systems including Constitutional, Eyes, Respiratory, Cardiovascular, Gastroenterology, Genitourinary, Integumentary, Musculoskeletal, and Psychiatric, were all negative, except for pertinent positives noted in the above HPI.    OBJECTIVE  There were no vitals taken for this visit.    General: Alert, without distress  HEENT: normocephalic, without obvious abnormality   Cardiovascular: regular rate and rhythm, no murmurs/rubs/gallops   Lungs: clear to auscultation bilaterally   Abdomen: soft, non-tender, non-distended, normal bowel sounds   Pelvic: deferred   Extremities: normal    LABS:   Component      Latest Ref Rng & Units 1/7/2020   Trichomonas Wet Prep      Negative Negative   Clue cells      Negative Negative   Yeast Wet Prep      Negative Negative   Specimen Descrip       Vagina   N Gonorrhea PCR      NEG:Negative Negative   Specimen Description       Vagina   Chlamydia Trachomatis PCR      NEG:Negative Negative       IMAGING:  Pelvic Ultrasound:   24 year old female with LMP 11/20/2019 presents for gynecologic ultrasound indicated by pelvic pain, rule out mullerian abnormality.  This study was done transabdominally.     Uterine findings:              Presence: Visible Size: Normal 3.3x4.0x2.5 cm.  Endometrium = 6.1 mm.              Cx length = 38.4 mm.                 Flexion:  Anteverted    Position: Midline          Margins: Smooth         Shape: Normal              Contour: Regular        Texture: Homogeneous          Cavity: Normal              Masses: Normal     Pelvic findings:               Right Adnexa: Normal              Left Adnexa: Normal              Bladder:  Normal                                                           Cul - de - sac fluid: None     Ovarian follicles:              Right ovary:  2.1x2.4x1.0cm.              Small follicles              Size(s):  Less  than 5mm              Left ovary:  1.7x1.3x0.91cm.              Small follicles              Size(s):  Less than 5mm  Comments:  normal uterine architecture but small overall size for woman of reproductive age.   RENZO Macdonald MD    ASSESSMENT  Dina Hoover is a 24 year old G0 who is here with vaginal pain.     PLAN  Gaviota and I discussed that I think her pain has several causes:     -Gaviota's exam today is without evidence of a uterine/mullerian anomaly, but I discussed with Gaviota that her pain precludes a thorough exam of the vagina. I recommended an abdominal pelvic ultrasound to further exclude this, as this would drastically change our management of her pain. She is in agreement.  -I believe her long-standing OCP use has led to some vaginal atrophy. I discussed that we could either switch methods to something non-systemic, like a Mirena IUD, or she could use topical estrogen cream. Gaviota would like to avoid an IUD if possible as she is worried about infertility (though she understands malposition/perforation is an unlikely outcome), so would like to proceed with vaginal estrogen cream. Reviewed how/where to apply and how often.  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 Discussed performing an exam under anesthesia, pap smear, and hymenectomy. Gaviota is in agreement. Discussed risks include incomplete resolution or worsening of pain, narrowing of the vagina after the procedure. To be scheduled at her earliest convenience.  -Finally, discussed that Gaviota has a very hypertonic pelvic floor, likely because of her long-standing pain. I discussed that because of this, hymenectomy and vaginal estrogen are unlikely to completely resolve her symptoms, and that I think pelvic floor PT is likely to help, possibly also with use of vaginal dilators. Gaviota is cautiously in agreement. We will continue to discuss in the post-operative period  as she recovers from surgery.      Evie Ludwig MD, MSCI    [facility]/OB[facility]N

## 2020-02-04 NOTE — ANESTHESIA CARE TRANSFER NOTE
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    Anesthesia Type:   No value filed.     Note:  Airway :Room Air  Patient transferred to:Phase II  Comments: At end of procedure, spontaneous respirations, patient alert to voice, able to follow commands. Patient breathing room air to PACU. SpO2, NiBP, and EKG monitors and alarms on and functioning, Ignacio Hugger warmer connected to patient gown, report on patient's clinical status given to PACU RN, RN questions answered.Handoff Report: Identifed the Patient, Identified the Reponsible Provider, Reviewed the pertinent medical history, Discussed the surgical course, Reviewed Intra-OP anesthesia mangement and issues during anesthesia, Set expectations for post-procedure period and Allowed opportunity for questions and acknowledgement of understanding      Vitals: (Last set prior to Anesthesia Care Transfer)    CRNA VITALS  2/4/2020 1334 - 2/4/2020 1409      2/4/2020             Pulse:  82    SpO2:  100 %                Electronically Signed By: JASON White CRNA  February 4, 2020  2:09 PM

## 2020-02-04 NOTE — DISCHARGE INSTRUCTIONS
[facility]   Adult Discharge Orders & Instructions     For 24 hours after surgery    1. Get plenty of rest.  A responsible adult must stay with you for at least 24 hours after you leave the hospital.   2. Do not drive or use heavy equipment.  If you have weakness or tingling, don't drive or use heavy equipment until this feeling goes away.  3. Do not drink alcohol.  4. Avoid strenuous or risky activities.  Ask for help when climbing stairs.   5. You may feel lightheaded.  IF so, sit for a few minutes before standing.  Have someone help you get up.   6. If you have nausea (feel sick to your stomach): Drink only clear liquids such as apple juice, ginger ale, broth or 7-Up.  Rest may also help.  Be sure to drink enough fluids.  Move to a regular diet as you feel able.  7. You may have a slight fever. Call the doctor if your fever is over 100 F (37.7 C) (taken under the tongue) or lasts longer than 24 hours.  8. You may have a dry mouth, a sore throat, muscle aches or trouble sleeping.  These should go away after 24 hours.  9. Do not make important or legal decisions.   Call your doctor for any of the followin.  Signs of infection (fever, growing tenderness at the surgery site, a large amount of drainage or bleeding, severe pain, foul-smelling drainage, redness, swelling).    2. It has been over 8 to 10 hours since surgery and you are still not able to urinate (pass water).    3.  Headache for over 24 hours.    4.  Numbness, tingling or weakness the day after surgery (if you had spinal anesthesia).  To contact a doctor, call ________________________________________

## 2020-02-04 NOTE — OP NOTE
[facility]  Full Operative Note    Date of Service: 2020    Surgeon: Evie Ludwig MD  Assistants: Debbi Abraham MD, PGY-1  Preop Dx: Hypoestrogenism, pelvic pain, hymenal abnormality   Postop Dx: Same as above, s/p procedure below  Procedure: Exam under anesthesia, pap smear, hymenectomy    Anesthesia: MAC  EBL:  20 cc  Specimens: Pap smear, endocervical brushings, hymenal remnant  Complications: None apparent    Indications: Ms. Dina Hoover is a 24 year old  with a history of pelvic pain and hymenal abnormality. An US was without evidence of mullerian abnormality. An exam under anesthesia with pap smear and hymenectomy was recommended. The risks and benefits were discussed with the patient, and she agreed to proceed.    Findings:   1. EUA with narrow vagina, posterior hymenal hypertrophy. Small, mobile, ante-mid positioned uterus.  2. Nulliparous cervix, transformation zone fully visualized.  3. Hemostasis at end of case.      Procedure Details:  The patient was brought to the OR where adequate MAC was administered.  Exam under anesthesia revealed the findings noted above.  A sterile speculum was placed. A pap test and endocervical brushing were obtained in the usual fashion and sent to pathology. The speculum was removed.   The vagina and vulva were then cleansed with betadine x3. The posterior hymenal remnant was grasped using a pickup with teeth. A 2.0x0.5cm section of the posterior hymen was removed using the scalpel from approximately 4-8 o clock. 4 interrupted stitches using 3-0 Vicryl for suture were placed along the remaining hymenal edge. Hemostasis was achieved.      The sponge, needle, and instrument counts were correct.  The patient tolerated the procedure well and was transferred to recovery in stable condition.  Dr. Ludwig was present and scrubbed for the entirety of the procedure.    Debbi Abraham MD  OB/GYN, PGY-1  2020, 2:12 PM

## 2020-02-04 NOTE — ANESTHESIA PREPROCEDURE EVALUATION
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 Rate: Normal     Resp. Effort: Normal      CV: Rhythm: Regular  Rate: Age appropriate  Heart: Normal Sounds  Edema: None   Comments:      Dental: Normal Dentition                LABS:  CBC: No results found for: WBC, HGB, HCT, PLT  BMP: No results found for: NA, POTASSIUM, CHLORIDE, CO2, BUN, CR, GLC  COAGS: No results found for: PTT, INR, FIBR  POC:   Lab Results   Component Value Date    BGM 83 2020    HCG Negative 2020     OTHER: No results found for: PH, LACT, A1C, EVA, PHOS, MAG, ALBUMIN, PROTTOTAL, ALT, AST, GGT, ALKPHOS, BILITOTAL, BILIDIRECT, LIPASE, AMYLASE, EDIL, TSH, T4, T3, CRP, SED     Preop Vitals    BP Readings from Last 3 Encounters:   20 99/82   20 115/84   20 112/72    Pulse Readings from Last 3 Encounters:   20 81   20 85   20 118      Resp Readings from Last 3 Encounters:   20 18    SpO2 Readings from Last 3 Encounters:   20 100%      Temp Readings from Last 1 Encounters:   20 36.9  C (98.4  F) (Oral)    Ht Readings from Last 1 Encounters:   20 1.524 m (5')      Wt Readings from Last 1 Encounters:   20 43.7 kg (96 lb 5.5 oz)    Estimated body mass index is 18.82 kg/m  as calculated from the  following:    Height as of this encounter: 1.524 m (5').    Weight as of this encounter: 43.7 kg (96 lb 5.5 oz).     LDA:  Peripheral IV 02/04/20 Left Hand (Active)   Site Assessment WDL 2/4/2020  1:05 PM   Line Status Saline locked 2/4/2020  1:05 PM   Phlebitis Scale 0-->no symptoms 2/4/2020  1:05 PM   Infiltration Scale 0 2/4/2020  1:05 PM   Infiltration Site Treatment Method  None 2/4/2020  1:05 PM   Number of days: 0        Assessment:   ASA SCORE: 1            Plan:   Anes. Type:  MAC               Maintenance: Propofol Sedation     PONV Management: Adult Risk Factors: Female   Prevention:, Propofol                   Tacho Alcala DO

## 2020-02-06 LAB
COPATH REPORT: NORMAL
PAP: NORMAL

## 2020-02-10 ENCOUNTER — TELEPHONE (OUTPATIENT)
Dept: OBGYN | Facility: CLINIC | Age: 25
End: 2020-02-10

## 2020-02-10 NOTE — TELEPHONE ENCOUNTER
Patient called to triage with question about when to resume use of premarin following hymenectomy.    Routing to Dr. Ludwig.

## 2020-02-14 LAB — COPATH REPORT: NORMAL

## 2020-02-15 NOTE — PROGRESS NOTES
[facility]  Gynecology Postoperative Visit    SUBJECTIVE    Dina Hoover is a 24 year old G0 who is here for a postoperative visit. She had a hymenectomy, EUA, and pap smear on 2/4/20. Since surgery, she is doing well. She has mild discomfort when walking, has not been needing pain medications. She feels the rubbing sensation has improved. She has not bled since the day after surgery. No concerns with urination or bowel movements. She would like to try a progestin-only contraceptive to see if that will help with the vaginal atrophy.    Her LMP is: No LMP recorded. (Menstrual status: Birth Control).      REVIEW OF SYSTEMS  A 10 point review of systems including Constitutional, Eyes, Respiratory, Cardiovascular, Gastroenterology, Genitourinary, Integumentary, Musculoskeletal, and Psychiatric, were all negative, except for pertinent positives noted in the above HPI.    OBJECTIVE  /77   Pulse 108   Ht 1.524 m (5')   Wt 43.5 kg (96 lb)   BMI 18.75 kg/m    Body mass index is 18.75 kg/m .    General: Alert, without distress  HEENT: normocephalic, without obvious abnormality   Cardiovascular:extremities warm and well-perfused   Lungs: normal work of breathing   Abdomen: soft, non-tender, non-distended   Pelvic: Deferred as is 2.5 weeks post-op   Extremities: normal    SURGICAL PATHOLOGY:  FINAL DIAGNOSIS:   Vagina, hymenal remnant, hymenectomy:   - Non-keratinizing squamous mucosa, without histopathologic abnormality     Pap smear 2/4/20:  CYTOLOGIC INTERPRETATION:   Negative for intraepithelial lesion or malignancy     ASSESSMENT  Dina Hoover is a 24 year old G0 who is here for a postoperative visit. She is 2 weeks after an EUA, hymenectomy, pap smear.    PLAN  - overall pain is improved since surgery and Gaviota is healing well.  - Transition to drospirenone only contraception, instructions to follow-up if cramping worsens with method switch. Because we are switching to a progestin-only  method, we hope that she has less  atrophy and we plan to transition off of the estrogen cream. Gaviota will continue the estrogen cream vaginally twice a week for a month, then stop. She will followup if she feels her symptoms are worsening and she needs to restart the estrogen cream.  - Follow-up PRN if pelvic pain is worsening and would like to consider pelvic floor physical therapy or vaginal dilators.    Otherwise will return for annual exam.    This patient was seen and examined with Dr. Ludwig.    Pamella Anderson, MS3    OBGYN Attending Addendum    I appreciate the note above by medical student, Pamella Anderson. I, Evie Ludwig, was present with the medical student, who participated in the service and in the documentation of the note. I have verified the history and personally performed the physical exam and medical decision making. I have edited accordingly and agree with the assessment and plan of care as documented in the note.    Evie Ludwig MD, MSCI    [facility]

## 2020-02-21 ENCOUNTER — OFFICE VISIT (OUTPATIENT)
Dept: OBGYN | Facility: CLINIC | Age: 25
End: 2020-02-21
Attending: OBSTETRICS & GYNECOLOGY
Payer: OTHER GOVERNMENT

## 2020-02-21 VITALS
DIASTOLIC BLOOD PRESSURE: 77 MMHG | HEART RATE: 108 BPM | HEIGHT: 60 IN | BODY MASS INDEX: 18.85 KG/M2 | SYSTOLIC BLOOD PRESSURE: 120 MMHG | WEIGHT: 96 LBS

## 2020-02-21 DIAGNOSIS — E28.39 HYPOESTROGENISM: ICD-10-CM

## 2020-02-21 DIAGNOSIS — Z98.890 POSTOPERATIVE STATE: Primary | ICD-10-CM

## 2020-02-21 DIAGNOSIS — R10.2 PELVIC PAIN IN FEMALE: ICD-10-CM

## 2020-02-21 DIAGNOSIS — N94.6 DYSMENORRHEA: ICD-10-CM

## 2020-02-21 ASSESSMENT — ANXIETY QUESTIONNAIRES
7. FEELING AFRAID AS IF SOMETHING AWFUL MIGHT HAPPEN: NOT AT ALL
6. BECOMING EASILY ANNOYED OR IRRITABLE: NOT AT ALL
GAD7 TOTAL SCORE: 3
5. BEING SO RESTLESS THAT IT IS HARD TO SIT STILL: NOT AT ALL
2. NOT BEING ABLE TO STOP OR CONTROL WORRYING: SEVERAL DAYS
3. WORRYING TOO MUCH ABOUT DIFFERENT THINGS: SEVERAL DAYS
1. FEELING NERVOUS, ANXIOUS, OR ON EDGE: SEVERAL DAYS

## 2020-02-21 ASSESSMENT — PATIENT HEALTH QUESTIONNAIRE - PHQ9
5. POOR APPETITE OR OVEREATING: NOT AT ALL
SUM OF ALL RESPONSES TO PHQ QUESTIONS 1-9: 6

## 2020-02-21 ASSESSMENT — MIFFLIN-ST. JEOR: SCORE: 1116.95

## 2020-02-21 NOTE — LETTER
2/21/2020       RE: Dina Hoover  [address] [place] [id number]     Dear Colleague,    Thank you for referring your patient, Dina Hoover, to the [facility] at [facility]. Please see a copy of my visit note below.    [facility]  Gynecology Postoperative Visit    SUBJECTIVE    Dina Hoover is a 24 year old G0 who is here for a postoperative visit. She had a hymenectomy, EUA, and pap smear on 2/4/20. Since surgery, she is doing well. She has mild discomfort when walking, has not been needing pain medications. She feels the rubbing sensation has improved. She has not bled since the day after surgery. No concerns with urination or bowel movements. She would like to try a progestin-only contraceptive to see if that will help with the vaginal atrophy.    Her LMP is: No LMP recorded. (Menstrual status: Birth Control).      REVIEW OF SYSTEMS  A 10 point review of systems including Constitutional, Eyes, Respiratory, Cardiovascular, Gastroenterology, Genitourinary, Integumentary, Musculoskeletal, and Psychiatric, were all negative, except for pertinent positives noted in the above HPI.    OBJECTIVE  /77   Pulse 108   Ht 1.524 m (5')   Wt 43.5 kg (96 lb)   BMI 18.75 kg/m     Body mass index is 18.75 kg/m .    General: Alert, without distress  HEENT: normocephalic, without obvious abnormality   Cardiovascular:extremities warm and well-perfused   Lungs: normal work of breathing   Abdomen: soft, non-tender, non-distended   Pelvic: Deferred as is 2.5 weeks post-op   Extremities: normal    SURGICAL PATHOLOGY:  FINAL DIAGNOSIS:   Vagina, hymenal remnant, hymenectomy:   - Non-keratinizing squamous mucosa, without histopathologic abnormality     Pap smear 2/4/20:  CYTOLOGIC INTERPRETATION:   Negative for intraepithelial lesion or malignancy     ASSESSMENT  Dina Hoover is a 24 year old G0 who is here for a postoperative  visit. She is 2 weeks after an EUA, hymenectomy, pap smear.    PLAN  - overall pain is improved since surgery and Gaviota is healing well.  - Transition to drospirenone only contraception, instructions to follow-up if cramping worsens with method switch. Because we are switching to a progestin-only method, we hope that she has less  atrophy and we plan to transition off of the estrogen cream. Gaviota will continue the estrogen cream vaginally twice a week for a month, then stop. She will followup if she feels her symptoms are worsening and she needs to restart the estrogen cream.  - Follow-up PRN if pelvic pain is worsening and would like to consider pelvic floor physical therapy or vaginal dilators.    Otherwise will return for annual exam.    This patient was seen and examined with Dr. Ludwig.    Pamella Anderson, MS3    OBGYN Attending Addendum    I appreciate the note above by medical student, Pamella Anderson. I, Evie Ludwig, was present with the medical student, who participated in the service and in the documentation of the note. I have verified the history and personally performed the physical exam and medical decision making. I have edited accordingly and agree with the assessment and plan of care as documented in the note.    Evie Ludwig MD, MSCI    [facility]/OBGYN

## 2020-02-21 NOTE — PATIENT INSTRUCTIONS
Continue estrogen cream twice a week for 4 more weeks. Then stop. Call if you think that your pain is worsening.    Transition to drospirenone only contraception. If in 3 months you feel that this contraception is not controlling your cramps, please call.    Check in at any time if you feel your pelvic pain is worsening and you would like to talk more about pelvic floor physical therapy.

## 2020-02-22 ASSESSMENT — ANXIETY QUESTIONNAIRES: GAD7 TOTAL SCORE: 3

## 2020-03-11 ENCOUNTER — HEALTH MAINTENANCE LETTER (OUTPATIENT)
Age: 25
End: 2020-03-11

## 2021-01-04 ENCOUNTER — HEALTH MAINTENANCE LETTER (OUTPATIENT)
Age: 26
End: 2021-01-04

## 2021-03-11 DIAGNOSIS — N94.6 DYSMENORRHEA: ICD-10-CM

## 2021-03-11 RX ORDER — DROSPIRENONE 4 MG/1
4 TABLET, FILM COATED ORAL DAILY
Qty: 112 TABLET | Refills: 3 | Status: SHIPPED | OUTPATIENT
Start: 2021-03-11 | End: 2021-03-15

## 2021-03-15 ENCOUNTER — TELEPHONE (OUTPATIENT)
Dept: OBGYN | Facility: CLINIC | Age: 26
End: 2021-03-15

## 2021-03-15 DIAGNOSIS — N94.6 DYSMENORRHEA: ICD-10-CM

## 2021-03-15 RX ORDER — DROSPIRENONE 4 MG/1
4 TABLET, FILM COATED ORAL DAILY
Qty: 112 TABLET | Refills: 3 | Status: SHIPPED | OUTPATIENT
Start: 2021-03-15 | End: 2021-11-19

## 2021-03-15 NOTE — TELEPHONE ENCOUNTER
----- Message from She Guzman RN sent at 3/15/2021  7:39 AM CDT -----  Regarding: Birth control refilled incorrectly  Should be for continuous for 3 months. Needs 4 packs of pills for 90 days.

## 2021-04-25 ENCOUNTER — HEALTH MAINTENANCE LETTER (OUTPATIENT)
Age: 26
End: 2021-04-25

## 2021-04-27 ENCOUNTER — TELEPHONE (OUTPATIENT)
Dept: PSYCHIATRY | Facility: CLINIC | Age: 26
End: 2021-04-27

## 2021-05-31 NOTE — TELEPHONE ENCOUNTER
First Attempt: LMTCB to have pt schedule an est care appt with a provider. Pt will need to come in for an appt before she can receive a mantoux shot, since she has not been seen at any [facility] clinic before.

## 2021-05-31 NOTE — TELEPHONE ENCOUNTER
New Appointment Needed  What is the reason for the visit:  MANTOUX   Mantoux Placement  Appt Request  What is the purpose of the mantoux?:  School: [employer]   Is there a form to be completed?:   Yes  How soon do you need the mantoux placed?:  date: ASAP    Provider Preference: Any available  How soon do you need to be seen?: ASAP  Waitlist offered?: No  Okay to leave a detailed message:  Yes

## 2021-10-10 ENCOUNTER — HEALTH MAINTENANCE LETTER (OUTPATIENT)
Age: 26
End: 2021-10-10

## 2021-10-26 ENCOUNTER — TELEPHONE (OUTPATIENT)
Dept: PSYCHIATRY | Facility: CLINIC | Age: 26
End: 2021-10-26

## 2021-11-03 ENCOUNTER — TRANSFERRED RECORDS (OUTPATIENT)
Dept: HEALTH INFORMATION MANAGEMENT | Facility: CLINIC | Age: 26
End: 2021-11-03
Payer: COMMERCIAL

## 2021-11-09 ENCOUNTER — VIRTUAL VISIT (OUTPATIENT)
Dept: PSYCHIATRY | Facility: CLINIC | Age: 26
End: 2021-11-09
Payer: COMMERCIAL

## 2021-11-09 DIAGNOSIS — F32.1 MAJOR DEPRESSIVE DISORDER, SINGLE EPISODE, MODERATE (H): Primary | ICD-10-CM

## 2021-11-09 DIAGNOSIS — F41.1 GAD (GENERALIZED ANXIETY DISORDER): ICD-10-CM

## 2021-11-09 NOTE — PROGRESS NOTES
"Department of Psychiatry   [facility]  Non-Medical Diagnostic Evaluation      Date of Service: Nov 9, 2021  Time of interview with patient: Start Time: 10:00 Stop Time: 10:30  Provider: Madiha Hawkins, PhD, LP  Psychiatrist: None  PCP: No Ref-Primary, Physician  Other Providers: None     Diagnoses:   Encounter Diagnoses   Name Primary?     Major depressive disorder, single episode, moderate (H) Yes     CED (generalized anxiety disorder)          Identifying Data:  Dina Hoover is a 25 year old female who prefers the name of Gaviota.    Sources of Information: Gathered by clinical interview, self-report forms, and chart review. The patient was a good historian.    Encounter Diagnoses   Name Primary?     Major depressive disorder, single episode, moderate (H) Yes     CED (generalized anxiety disorder)        CHIEF COMPLAINT     Patient presents to therapy with \"persistent depression\" that began when patient was 19 and has persisted ever since despite several medication trials.        HISTORY OF PRESENT ILLNESS        Patient notes that her depression began at 19 and initially manifested as increased sleep which eventually combined with depressed mood and increased anxiety. She notes that her anxiety predated her depressive symptoms and began from an early age: \"I remember having breakdowns when I was 6 years old\".     Patient starting taking medication for anxiety at ~17 years old and has had several medication trials (she notes 5+) for her depression from age 19 on with little benefit. She also endorses cognitive symptoms related to her depression, including decreased memory.     PSYCHIATRIC AND DIAGNOSTIC INTERVIEW     Depression: For the past two weeks, Dina Hoover reports anhedonia, depressed mood/feeling hopeless/excessive crying, excessive sleeping (10 hours - wants 8), low energy, reduced appetite, feeling bad about self/worthless/excessive guilt, poor concentration/memory (\"memory is " "garbage\", feels like started at 20, had amazing memory)/indecisiveness, and passive suicidal ideation (\"If I got hit by a bus, would it be that bad?\" But no active thoughts). Patient denied suicide plan and intent.    Sleep: oversleeps (10 hours)    Eating Disorder: Anorexic when ages [age], just \"got over it\"; very tied to self-worth, felt like got a lot prettier at 18, felt better about herself, stopped obsessing about weight (occasional purging: 3x total)    History of Depression (prior episodes): Patient recalls first feeling depressed around age 19 and having felt depressed ever since.    Alexandra/hypomania: none    Panic: No panic disorder  Symptoms include: Has had a couple panic attacks, last one two years ago (doesn't worry about having them now); was triggered by a bad association      Agoraphobia: None    Social anxiety: Below threshold. Although patient has some anxiety in social situations due to fears of judgement (meeting new people, eating in front of others, giving a speech), her anxiety doesn't stop her from participating.    Obsessions: Some obsessive thoughts that do not cause significant distress or impairment. Word/phrase stuck in head, distasteful, slur (against LGBTQ people), images: if sees something she doesn't want to on internet will keep thinking about it (mostly bad things happening to animals); occasional thing (sometimes will happen couple times a week, sometimes it won't happen for a month); when pops up, annoying, doesn't like it, but can ignore it (pretty easy)    Compulsions: none      Trauma history: Hawkins about friend and sister's trauma  PTSD: None    Generalized Anxiety: Excessive worry about several routine things (driving, work, finances, family), anxieties and worries present most days, has difficulty controlling worries sometimes. During times of anxiety, pt endorsed feeling restless/on edge, muscle tension, tired/easily exhausted, irritability    Psychosis: None    Substance " use history:  No notable use    Tobacco: No  First Regular Use:  Pattern of Use:  Date of Last Use:    Alcohol: (has an occasional drink, 2x per year)  First Regular Use:  Pattern of Use:  Date of Last Use:    Cannabis:   First Regular Use:  Pattern of Use:  Date of Last Use:    Other Illicit Drugs:   First Regular Use:  Pattern of Use:  Date of Last Use:    Antisocial Personality:    SAFETY ASSESSMENT     Suicide:  Level of immediate risk: low  Ideation/Plan/Intent: passive ideation, no plan or intent    Self-injurious Behaviors [method, most recent]: none  Suicide Attempt [#, recent, method]: none    Homicide/Violence:  Assessed level of immediate risk: none  Denies ideation, plan, and intent.    PSYCHIATRIC AND SUBSTANCE USE TREATMENT HISTORY     Current psychiatric medications: Fluoxetine 60mg  Current major medical issues: chronic back pain    Psychiatric treatment history:     Psych Inpatient Hospitalizations [#, most recent]: none    ECT [#, most recent]: none    Outpatient Programs [DBT, Day Treatment, Eating Disorder Tx, etc, IOP]:   Individual Therapy: Worked with therapist in college for 2.5 years. Therapist gave her sheets about things to make plans/goals, work on; 'counseling); was helpful to have place to talk, felt at the end like she had plateaued, wanted to be pushed further, after certain amount of time felt like not helping that much anymore.    Substance use treatment history (e.g., individual/group psychotherapy, antabuse, MAT, residential, AA/NA): none    SOCIAL AND FAMILY HISTORY     Upbringing: Dina Hoover was born and raised  together with two siblings by both parents in [place], [place].   Development: Met developmental milestones on time.  Head injuries: Concussion when in 8th grade, didn't lose consciousness  Life Events/Stressors: none  Academic (e.g., problems in school, learning difficulties, highest education): Did well in school    Current Living Situation/Family  Relationships: Lives in a house with her her girlfriend and her girlfriend's parents  Leisure time and interests: sewing, TV, animal crossing, reading  Exercise: does, not a ton, goes on walks, ab exercises 3-4x per week  Children: none  Marital status: no  Occupation/ Financial Support: none  Cultural/ Social/ Spiritual/ Episcopal Support: none  Legal History: no    Family psychiatric history: sister has mood disorder and has attempted suicide multiple times, mother depression and SAD, other sister SAD and depression, aunt bipolar    MENTAL STATUS EXAM                                                                                       Alertness: alert  Appearance: well groomed  Behavior/Demeanor: appropriate with good eye contact   Speech: Intact. Normal volume, chandrakant. No prosody.  Language: English  Psychomotor: appears intact  Mood: Euthymic  Affect: Congruent to content  Thought Process/Associations: normal  Thought Content:  Denies hallucinations/delusions  Insight: good  Judgment: good  Cognition: (6) not assessed but endorses decreased memory  Gait and Station: not assessed due to telehealth    ASSESSMENT DATA                                                                                      PHQ 1/7/2020 1/28/2020 2/21/2020   PHQ-9 Total Score 8 7 6   Q9: Thoughts of better off dead/self-harm past 2 weeks Not at all Not at all Not at all     CED-7 SCORE 1/7/2020 1/28/2020 2/21/2020   Total Score - - -   Total Score 7 3 3        ASSESSMENT SUMMARY     Dina Hoover is a 25 year old White Not  or  female with a psychiatric history of persistent MDD who presented for a comprehensive assessment of psychiatric symptoms in the Department of Psychiatry. Gaviota presented today as a good historian and has good insight into her current circumstances. Diagnoses of major depressive disorder, single episode and generalized anxiety disorder seem supported by patient report, collateral records, and  the MINI 7.0.2.     The patient was first diagnosed and treated for CED at age 17 and MDD at age 19. CED symptoms include  chronic and excessive worry about several routine things which prompts physical symptoms including muscle tension and fatigue. MDD symptoms include excessive sleeping, decreased appetite, energy, and motivation, low mood, anhedonia, difficulty concentrating and poor memory, and passive suicidal ideation.      Starting with age 19, the patient has tried a number of different psychiatric medications and therapy for MDD with little benefit.     Plan     Undergo additional evaluations with pharmacist and psychiatrist.    Nico Mohamud    I did not directly participate in this patient visit. I discussed this patient with the above trainee in individual supervision and agree with the note and plan as documented.    Supervisor: Madiha Hawkins, PhD, LP

## 2021-11-13 ENCOUNTER — OFFICE VISIT (OUTPATIENT)
Dept: FAMILY MEDICINE | Facility: CLINIC | Age: 26
End: 2021-11-13
Payer: COMMERCIAL

## 2021-11-13 VITALS
HEART RATE: 118 BPM | SYSTOLIC BLOOD PRESSURE: 108 MMHG | RESPIRATION RATE: 16 BRPM | OXYGEN SATURATION: 98 % | TEMPERATURE: 98.5 F | DIASTOLIC BLOOD PRESSURE: 79 MMHG

## 2021-11-13 DIAGNOSIS — R05.9 COUGH: ICD-10-CM

## 2021-11-13 DIAGNOSIS — R07.0 THROAT PAIN: Primary | ICD-10-CM

## 2021-11-13 DIAGNOSIS — J02.9 ACUTE PHARYNGITIS, UNSPECIFIED ETIOLOGY: ICD-10-CM

## 2021-11-13 LAB
DEPRECATED S PYO AG THROAT QL EIA: NEGATIVE
GROUP A STREP BY PCR: NOT DETECTED

## 2021-11-13 PROCEDURE — U0005 INFEC AGEN DETEC AMPLI PROBE: HCPCS | Performed by: FAMILY MEDICINE

## 2021-11-13 PROCEDURE — 99204 OFFICE O/P NEW MOD 45 MIN: CPT | Performed by: FAMILY MEDICINE

## 2021-11-13 PROCEDURE — 87651 STREP A DNA AMP PROBE: CPT | Performed by: FAMILY MEDICINE

## 2021-11-13 PROCEDURE — U0003 INFECTIOUS AGENT DETECTION BY NUCLEIC ACID (DNA OR RNA); SEVERE ACUTE RESPIRATORY SYNDROME CORONAVIRUS 2 (SARS-COV-2) (CORONAVIRUS DISEASE [COVID-19]), AMPLIFIED PROBE TECHNIQUE, MAKING USE OF HIGH THROUGHPUT TECHNOLOGIES AS DESCRIBED BY CMS-2020-01-R: HCPCS | Performed by: FAMILY MEDICINE

## 2021-11-13 RX ORDER — FLUOXETINE 40 MG/1
40 CAPSULE ORAL DAILY
COMMUNITY
Start: 2021-11-03

## 2021-11-13 RX ORDER — AMOXICILLIN 500 MG/1
500 CAPSULE ORAL 3 TIMES DAILY
Qty: 30 CAPSULE | Refills: 0 | Status: SHIPPED | OUTPATIENT
Start: 2021-11-13 | End: 2021-11-23

## 2021-11-13 NOTE — PATIENT INSTRUCTIONS
Stay well hydrated    Fill prescription is strep test positive or if symptoms worsen/ don't improve

## 2021-11-13 NOTE — PROGRESS NOTES
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   HENT:      Head: Normocephalic and atraumatic.      Right Ear: Tympanic membrane, ear canal and external ear normal.      Left Ear: Tympanic membrane, ear canal and external ear normal.      Nose: Nose normal.      Mouth/Throat:      Mouth: Mucous membranes are moist.      Pharynx: Oropharynx is clear.   Eyes:      Conjunctiva/sclera: Conjunctivae normal.   Cardiovascular:      Rate and Rhythm: Normal rate and regular rhythm.      Heart sounds: Normal heart sounds.   Pulmonary:      Effort: Pulmonary effort is normal. No respiratory distress.      Breath sounds: Normal breath sounds.   Abdominal:      Palpations: Abdomen is soft.      Tenderness: There is no abdominal tenderness.   Neurological:      Mental Status: She is alert.       Some submandib and maxillary sinus type tenderness    Qs neg    ASSESSMENT / PLAN:  (R07.0) Throat pain  (primary encounter diagnosis)  Comment: qs neg.  covid swab done.    Plan: Streptococcus A Rapid Screen w/Reflex to PCR -         Clinic Collect, Group A Streptococcus PCR         Throat Swab             (R05.9) Cough  Comment: as above   Plan: Symptomatic COVID-19 Virus (Coronavirus) by PCR        Nose             (J02.9) Acute pharyngitis, unspecified etiology  Comment: patient given paper prescription.  Don't fill yet.    Plan: amoxicillin (AMOXIL) 500 MG capsule        Fill prescription if symptoms worsen or if confirmatory strep test neg.     Stay well hydrated    Follow up prn       I reviewed the patient's medications,  allergies, medical history, family history, and social history.    Satya Brand MD

## 2021-11-15 LAB — SARS-COV-2 RNA RESP QL NAA+PROBE: NEGATIVE

## 2021-11-19 ENCOUNTER — TELEPHONE (OUTPATIENT)
Dept: OBGYN | Facility: CLINIC | Age: 26
End: 2021-11-19

## 2021-11-19 DIAGNOSIS — N94.6 DYSMENORRHEA: ICD-10-CM

## 2021-11-19 RX ORDER — DROSPIRENONE 4 MG/1
4 TABLET, FILM COATED ORAL DAILY
Qty: 112 TABLET | Refills: 3 | Status: SHIPPED | OUTPATIENT
Start: 2021-11-19 | End: 2024-01-31

## 2021-11-19 NOTE — TELEPHONE ENCOUNTER
----- Message from Elodia Whititngton RN sent at 11/19/2021 11:49 AM CST -----  Regarding: rx to new pharmacy  Saw Dr Ludwig last year, rx for Slynd.  Changed pharmacies and would like sent to [facility] [address]

## 2021-11-24 ENCOUNTER — VIRTUAL VISIT (OUTPATIENT)
Dept: PSYCHIATRY | Facility: CLINIC | Age: 26
End: 2021-11-24
Payer: COMMERCIAL

## 2021-11-24 DIAGNOSIS — F32.1 MAJOR DEPRESSIVE DISORDER, SINGLE EPISODE, MODERATE (H): Primary | ICD-10-CM

## 2021-11-24 NOTE — PROGRESS NOTES
Called patient, left voicemail message to call back to complete rooming process.  Gavi Davila, CMA

## 2021-11-24 NOTE — PROGRESS NOTES
Gaviota is a 25 year old who is being evaluated via a billable video visit.      How would you like to obtain your AVS? MyChart  If the video visit is dropped, the invitation should be resent by: Send to e-mail at: [email or web address]  Will anyone else be joining your video visit? No       Depression Response    Patient completed the PHQ-9 assessment for depression and scored >9? Yes  Question 9 on the PHQ-9 was positive for suicidality? No  Does patient have current mental health provider? No    Is this a virtual visit? Yes   Does patient have suicidal ideation (positive question 9)? No - offer to place Mental Health Referral.  Referral order pended    I personally notified the following: visit provider    Video Start Time: 1:00 pm  Video-Visit Details    Type of service:  Video Visit    Video End Time:1:40 pm    Originating Location (pt. Location): Home    Distant Location (provider location):  [facility]     Platform used for Video Visit: [employer]

## 2021-11-25 ASSESSMENT — PATIENT HEALTH QUESTIONNAIRE - PHQ9: SUM OF ALL RESPONSES TO PHQ QUESTIONS 1-9: 13

## 2021-11-29 NOTE — PROGRESS NOTES
Service Date: 11/24/2021     PHYSICIAN TRD PROGRAM MEDICATION THERAPY MANAGEMENT WORKING NOTE     This was a video visit from my home to the patient's home and we used [facility].  The video visit was due to COVID. Patient's email is [email or web address]  Her phone number is [phone].    Starting time was 1 p.m.  Ending time was 1:40 p.m.      DICTATION IDENTIFIER:  NAME:  Dina BALDERAS   YOB: 1995.  VIDEO VISIT:  11/24/2021.    IDENTIFYING INFORMATION AND INTRODUCTION:  Gaviota is a 25-year-old female seen on a video visit today for an  Physician TRD MT consultation.  The patient lives in [place], [place], which is her girlfriend/partner's parents' home.  The patient is a new adult to the  Physician TRD Program.  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    Psychiatrist is Dr. Woody Stubbs and she will see her on 12/02/2021 in person and this was communicated to the patient.    CHIEF COMPLAINT:    1.  Depression that probably causes her memory issues.  2.  Anxiety, which is actually relatively well controlled with fluoxetine.  3.  The patient has some hypersomnolence.    REASON FOR CONSULTATION:  Rating medication trials for antidepressant failure and assessment of antidepressant drugs and their history.    Informants include the patient and review of past medical records.    Time spent was 1 hour without the patient and 40 minutes with the patient.    MEDICATION INFORMATION:      1.  Current Psychotropic Medications:  A.  Fluoxetine 20 and 40 mg tablets.  The patient takes 60 mg at bedtime.  Indication:  Anxiety and depression.  B.  Melatonin 5 mg at bedtime for sleep.  C.  Clonazepam 0.5 mg p.r.n.  Was not used for the last year and a half.  D.  Diphenhydramine 25 mg.  The patient takes 50 mg p.r.n. for sleep if she has an issue.    2.  Concomitant Medications  A.  Drospirenone 4 mg for dysmenorrhea.  B.  Zyrtec  10 mg daily for allergies.  C.  Advil, maybe once a week.   D.  Famotidine p.r.n. for GERD.    E.  Calcium carbonate p.r.n. for GERD.  F.  Excedrin p.r.n. for headaches, maybe once a week.    3.  Herbal Remedies:    A. Vitamin D 2000 International Units or 50 mcg per day.    4.  Drug-Drug Interactions:    A. Excedrin and fluoxetine may result in an increased risk of bleeding.  Excedrin is used only p.r.n. once a week, so it should not be an issue.  B.  Cetirizine and diphenhydramine may result in increased risk of CNS depression.  C.  Fluoxetine and famotidine may result in increased risk of QT interval prolongation.  D.  Drospirenone and the caffeine in the Excedrin may result in enhanced CNS stimulation. Excedrin is used only p.r.n. once a week.    5.  Current Reported Side Effects:  None.    6.  Gene testing was never done.    7.  Allergies:  A.  Cats.  B.  Dogs.  C.  Grass.  D.  Pineapple.  All of them causing itching.    PAST MEDICAL HISTORY:    1.  MDD.  2.  CED.  3.  Memory decline.  4.  Anorexic age 15 until 17 and she got over it. At that time, she would have occasional purging.    5.  Head injury, concussion at eighth grade without loss of consciousness.  6.  Dysmenorrhea.  7.  GERD.  8.  Vaginosis.   9.  Allergic rhinitis.  10.  The patient lives with her female partner.  11.  Family mental health:  Sister with mood disorder and attempted suicide multiple times. Mother has depression and SAD.  Other sister has SAD and depression and is bipolar.    DEPRESSION HISTORY:    1.  First time experienced anxiety at the age of 6 and depression actually set in around the age of 19.  She had increase of sleep and then she had increase of anxiety, which led to depression.    2.  First time antidepressant drugs were started: At age 15 for anxiety and that was Prozac, and at the age of [age], she was on Zoloft for the depression.    3.  Last episode started approximately 1-1/2 years ago.  It is a really difficult time  due to COVID.  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  She is more into the research aspect and currently nothing is open. Also can not see friends and family.    4.  Hospitalization for severe suicidal ideation or suicide attempt:  Negative.  5.  Outpatient treatments denied.  6.  Currently suicidal:  The patient denied it.  7.  Individual psychotherapy:  She had a counselor and psychiatrist through high school and college.  8.  Cognitive behavioral therapy:  Negative.  9.  Dialectic behavioral therapy:  Negative.    SOCIAL AND ENVIRONMENTAL ASPECTS:  She lives with her female partner and the female partner's parents.  She is together with her current partner for 3-1/2 years.  They have 2 dogs.    PHARMACOTHERAPY INDICATORS:    1.  The patient has MA with prescription coverage with her insurance plan.  Prescription drug copayment is $1.  The cost of obtaining prescribed medications does interfere with compliance.  For example, the birth control she is currently on is very expensive but very helpful and insurance is not really paying for.  2.  Pharmacy is [facility] and [place] [id number].   3.  Compliance Assessment:  Patient is independent in medication administration.  She is a good historian.  She does use a pillbox and it is a weekly one.  When I asked her how often she misses medication, she says almost never. When I asked her when her depression gets too severe to whom she turns, she says girlfriend or to mom and one of her best friends.    4.  Habits and Chemical Use:    A.  Alcohol only very occasionally.  B.  Tobacco:  Never.  C.  Caffeine:  A cup of coffee or here and there a Coke.  D.  Recreational drugs:  Negative.    E.  Exercise:  She goes for walks.  She does abdominal exercises 3-4 times per week.  D.  Hobbies:  Sewing, TV, reading, arts and crafts.    RATING OF ANTIDEPRESSANT DRUGS:      1.  Selective serotonin reuptake inhibitors  (SSRIs):  A. Citalopram/Celexa?  B.  Escitalopram/Lexapro?  C.  Fluoxetine/Prozac:  From 2011 to present off and on.  Max dose 80 mg per day.  That would give it a rating of 4.  It was efficacious for anxiety but not for depression.  Side effect was nervous energy when she was on this high dose.  D.  Sertraline/Zoloft:  Approximately 3 months when she was 19 and it was ineffective.      2.  Serotonin noradrenaline reuptake inhibitors (SNRIs):  Negative.      3.  Serotonin modulators and stimulators:  Negative.    4 Noradrenaline and dopamine reuptake inhibitors (NDRIs):  A. Bupropion/Wellbutrin:  From 2015 until 2020.  Max dose 150 mg.  According to the patient, she said it was not doing much.  Was probably used with other medications together.    5.  Tricyclic antidepressants (TCA):  Negative.    6.  Tetracyclic antidepressants:  Negative.    7.  Monoamine oxidase inhibitors (MAOIs):  Negative.    8.  Augmentation Therapy  -- Antipsychotics:  A.  Lurasidone/Latuda was tried, 20 mg in 2015/2016 together with Prozac 40 mg and it was ineffective.  B.  Quetiapine/Seroquel was tried and she did not like the side effect.    -- Stimulants:  Negative.    -- Benzodiazepines.    A.  Clonazepam/Klonopin:  0.5 mg p.r.n.  She has it for a couple of years, but she is not using it very frequently.  It is only an emergency medication.      9.  Miscellaneous:  A.  Omega-3, triglycerides: Yes, no change.  B.  Estrogen cream and oral contraceptives with estrogen and progesterone used a lot for her PMS.     10.  Miscellaneous sleep aids:  A.  Diphenhydramine/Benadryl:  Yes, it works for a couple of months.    B.  Melatonin 10 mg. It is currently used and it helps.    11.  Ketamine treatment history:  Negative but the patient is very interested in that.    12.  Other reported treatments for depression and related mood disorder.  A. TMS:  Negative.  B.  ECT:  Negative.  C. VNS:  Negative.    D.  The patient had tDCS for depression with  [facility], a study twice per week from March until .  This was a study for people 17-24 years old.  She was not sure if she got placebo or active together with mindful breathing and tDCS and she got a little bit better.  Patient stated she will call them and see if she was placebo or active.    E.  Bright lights.  She just started using it again and it helps.    COMMENTS:    1.  The patient will follow up with MTM as needed.  2.  All MTM findings will be shared with clinic psychiatrist.  3.  The patient was instructed to return for upcoming appointment with Dr. Stubbs for recommendation and future treatment options.  in person.    Thank you for the opportunity to participate in the care of this patient.    Karolyn Carrillo, PharmD  Pharmaceutical Care Coordinator    Karolyn Carrillo, Jori        D: 2021   T: 2021   MT: deon    Name:     JOANA BALDERAS  MRN:      [id number]        Account:      [id number]   :      1995           Service Date: 2021       Document: [id number]

## 2021-12-02 ENCOUNTER — OFFICE VISIT (OUTPATIENT)
Dept: PSYCHIATRY | Facility: CLINIC | Age: 26
End: 2021-12-02
Payer: COMMERCIAL

## 2021-12-02 VITALS — HEART RATE: 100 BPM | SYSTOLIC BLOOD PRESSURE: 100 MMHG | DIASTOLIC BLOOD PRESSURE: 68 MMHG | TEMPERATURE: 97.5 F

## 2021-12-02 DIAGNOSIS — G47.10 HYPERSOMNIA: ICD-10-CM

## 2021-12-02 DIAGNOSIS — F41.1 GAD (GENERALIZED ANXIETY DISORDER): ICD-10-CM

## 2021-12-02 DIAGNOSIS — F33.2 SEVERE EPISODE OF RECURRENT MAJOR DEPRESSIVE DISORDER, WITHOUT PSYCHOTIC FEATURES (H): Primary | ICD-10-CM

## 2021-12-02 ASSESSMENT — PAIN SCALES - GENERAL: PAINLEVEL: MILD PAIN (3)

## 2021-12-02 NOTE — PROGRESS NOTES
" [facility] TMS [facility]  [address], [address]  [place] [address]  New Patient Evaluation       Dina Hoover is a 26 year old female who prefers the name Dank   Therapist: None  PCP: No Ref-Primary, Physician  Other Providers: None  Referred by Friend  for evaluation of depression.     History was provided by patient who was a good historian.    Psych critical item history includes Hx of abnormal eating behavior, Majord erpressive disorder, mutiple psychotropic trials , major medical problems (pain with sex) and OCD vs OCPD. TDCS trial without response (unknown if active or placebo).25 yo F who reports onset of anxiety pre-puberty, then eating disorder [age] with residual symtpoms and depression episode unchnaged since age 19 with hypersomnia cognitive dulling. biological loading      Chief Complaint                                                                                                        \" Its hard for me to talk about this  I worry I won't convey it right or I won't be listened to \"     History of Present Illness                                                                                4, 4      Pertinent Background:   She fist experienced anxiety in early childhood and  Started to have panic attacks as puberty approached and at that time she developed abnormal eating behavior and focus on her body shape. Eventually she wa soffered rewards from parents to normalize her etaing nd this corrected.  And by age 18 she developed depression as well. d  She feels like she has had some benefit from therapy and that she does best in structured settings with expectations and goals like in schooling  Psychiatric Review of Symptoms:    she feels she has never fully recovered from depressive episode . denies episodic course or ever a period of remission since starting treatment. She feels treatments impact anxiety but not depression.   She is hypercritical of herself and criticism is " "very impactful  Depression: Positive for depressive symptoms including sadness, , anhedonia, social isolation,hypersomnia,  psychomotor retardation, fatigue, feeling worthless, guilt, poor concentration, indecisiveness, passive thoughts of death,lack of sexual pleasure    Anxiety:    Panic attacks in puberty but those have resolved. Currently she has multiple reality base d concerns about whether she will be able to get full time employment, what will she achieve with her life, is she keeping up with her peers in success she avoids big crowds often leaves places when she feels overstimulated. She is afraid of \"blocking someone or being in the way\"  Driving is very difficult as it makes her anxious..Positive for symptoms of anxiety including pchills and hot flushes, social phobia, obsessions/compulsions more intense in childhood counting steps must be divisible by 4 tapping until a  feeling of balance achieved, sticky thoughts  Denies intrusive sexual or blasphemous thoughts or immages , , generalized worry, restlessness, fatigue, poor concentration, irritability, muscle tension and insomnia    she worries her \"body is broken and that is why she is fatigued and has sexual dysfunction\"    PTSD: Denies traumatic experience of sufficient severity to meet criterion A necessary for diagnosis of PTSD. Endorses PTSD.    Alexandra: Negative  Psychosis: Negative   Eating disorder: Negative  Homicidal Ideation: Negative         Recent Substance Use:  none reported      Substance Use History     Past Use- very rare alcohol use 2 drinks per year   Treatment [#, most recent]- None    Medical Consequences [withdrawal, sz etc]- SIB- None   HIV/Hepatitis- SIB- None    Legal Consequences- SIB- None       Psychiatric History     Past diagnoses: MDD , CED  Suicidal ideation- passive  Suicide Attempt:   #-0  Most Recent- n/a  SIB- None   Alexandra- None    Psychosis- None    Violence/Aggression- None   Psych Hosp- None   ECT- None   Eating " "Disorder- yes without formal treatment reports resolved in puberty   Outpatient Programs [ DBT, Day Treatment, Eating Disorder Tx etc]- Worked with therapist in college for 2.5 years. Therapist gave her sheets about things to make plans/goals, work on; 'counseling); was helpful to have place to talk, felt at the end like she had plateaued, wanted to be pushed further, after certain amount of time felt like not helping that much anymore        Psychiatric Medication Trials     Per Dr Carrillo's note 11/24/21  \"   1.  Current Psychotropic Medications:  A.  Fluoxetine 20 and 40 mg tablets.  The patient takes 60 mg at bedtime.  Indication:  Anxiety and depression.  B.  Melatonin 5 mg at bedtime for sleep.  C.  Clonazepam 0.5 mg p.r.n.  Was not used for the last year and a half.  D.  Diphenhydramine 25 mg.  The patient takes 50 mg p.r.n. for sleep if she has an issue.     2.  Concomitant Medications  A.  Drospirenone 4 mg for dysmenorrhea.  B.  Zyrtec 10 mg daily for allergies.  C.  Advil, maybe once a week.   D.  Famotidine p.r.n. for GERD.    E.  Calcium carbonate p.r.n. for GERD.  F.  Excedrin p.r.n. for headaches, maybe once a week.     3.  Herbal Remedies:    A. Vitamin D 2000 International Units or 50 mcg per day.     4.  Drug-Drug Interactions:    A. Excedrin and fluoxetine may result in an increased risk of bleeding.  Excedrin is used only p.r.n. once a week, so it should not be an issue.  B.  Cetirizine and diphenhydramine may result in increased risk of CNS depression.  C.  Fluoxetine and famotidine may result in increased risk of QT interval prolongation.  D.  Drospirenone and the caffeine in the Excedrin may result in enhanced CNS stimulation. Excedrin is used only p.r.n. once a week.     5.  Current Reported Side Effects:  None.     6.  Gene testing was never done.     7.  Allergies:  A.  Cats.  B.  Dogs.  C.  Grass.  D.  Pineapple.  All of them causing itching.     PAST MEDICAL HISTORY:    1.  MDD.  2.  " CED.  3.  Memory decline.  4.  Anorexic age 15 until 17 and she got over it. At that time, she would have occasional purging.    5.  Head injury, concussion at eighth grade without loss of consciousness.  6.  Dysmenorrhea.  7.  GERD.  8.  Vaginosis.   9.  Allergic rhinitis.  10.  The patient lives with her female partner.  11.  Family mental health:  Sister with mood disorder and attempted suicide multiple times. Mother has depression and SAD.  Other sister has SAD and depression and is bipolar.     DEPRESSION HISTORY:    1.  First time experienced anxiety at the age of 6 and depression actually set in around the age of 19.  She had increase of sleep and then she had increase of anxiety, which led to depression.     2.  First time antidepressant drugs were started: At age 15 for anxiety and that was Prozac, and at the age of [age], she was on Zoloft for the depression.     3.  Last episode started approximately 1-1/2 years ago.  It is a really difficult time due to COVID.  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  She is more into the research aspect and currently nothing is open. Also can not see friends and family.     4.  Hospitalization for severe suicidal ideation or suicide attempt:  Negative.  5.  Outpatient treatments denied.  6.  Currently suicidal:  The patient denied it.  7.  Individual psychotherapy:  She had a counselor and psychiatrist through high school and college.  8.  Cognitive behavioral therapy:  Negative.  9.  Dialectic behavioral therapy:  Negative.     SOCIAL AND ENVIRONMENTAL ASPECTS:  She lives with her female partner and the female partner's parents.  She is together with her current partner for 3-1/2 years.  They have 2 dogs.     PHARMACOTHERAPY INDICATORS:    1.  The patient has MA with prescription coverage with her insurance plan.  Prescription drug copayment is $1.  The cost of obtaining prescribed medications does interfere with  compliance.  For example, the birth control she is currently on is very expensive but very helpful and insurance is not really paying for.  2.  Pharmacy is [facility] [address] [id number].   3.  Compliance Assessment:  Patient is independent in medication administration.  She is a good historian.  She does use a pillbox and it is a weekly one.  When I asked her how often she misses medication, she says almost never. When I asked her when her depression gets too severe to whom she turns, she says girlfriend or to mom and one of her best friends.     4.  Habits and Chemical Use:    A.  Alcohol only very occasionally.  B.  Tobacco:  Never.  C.  Caffeine:  A cup of coffee or here and there a Coke.  D.  Recreational drugs:  Negative.    E.  Exercise:  She goes for walks.  She does abdominal exercises 3-4 times per week.  D.  Hobbies:  Sewing, TV, reading, arts and crafts.     RATING OF ANTIDEPRESSANT DRUGS:       1.  Selective serotonin reuptake inhibitors (SSRIs):  A. Citalopram/Celexa?  B.  Escitalopram/Lexapro?  C.  Fluoxetine/Prozac:  From 2011 to present off and on.  Max dose 80 mg per day.  That would give it a rating of 4.  It was efficacious for anxiety but not for depression.  Side effect was nervous energy when she was on this high dose.  D.  Sertraline/Zoloft:  Approximately 3 months when she was 19 and it was ineffective.       2.  Serotonin noradrenaline reuptake inhibitors (SNRIs):  Negative.       3.  Serotonin modulators and stimulators:  Negative.     4 Noradrenaline and dopamine reuptake inhibitors (NDRIs):  A. Bupropion/Wellbutrin:  From 2015 until 2020.  Max dose 150 mg.  According to the patient, she said it was not doing much.  Was probably used with other medications together.     5.  Tricyclic antidepressants (TCA):  Negative.     6.  Tetracyclic antidepressants:  Negative.     7.  Monoamine oxidase inhibitors (MAOIs):  Negative.     8.  Augmentation Therapy  -- Antipsychotics:  A.   "Lurasidone/Latuda was tried, 20 mg in 2015/2016 together with Prozac 40 mg and it was ineffective.  B.  Quetiapine/Seroquel was tried and she did not like the side effect.     -- Stimulants:  Negative.     -- Benzodiazepines.    A.  Clonazepam/Klonopin:  0.5 mg p.r.n.  She has it for a couple of years, but she is not using it very frequently.  It is only an emergency medication.       9.  Miscellaneous:  A.  Omega-3, triglycerides: Yes, no change.  B.  Estrogen cream and oral contraceptives with estrogen and progesterone used a lot for her PMS.      10.  Miscellaneous sleep aids:  A.  Diphenhydramine/Benadryl:  Yes, it works for a couple of months.    B.  Melatonin 10 mg. It is currently used and it helps.     11.  Ketamine treatment history:  Negative but the patient is very interested in that.     12.  Other reported treatments for depression and related mood disorder.  A. TMS:  Negative.  B.  ECT:  Negative.  C. VNS:  Negative.    D.  The patient had tDCS for depression with [facility], a study twice per week from March until June.  This was a study for people 17-24 years old.  She was not sure if she got placebo or active together with mindful breathing and tDCS and she got a little bit better.  Patient stated she will call them and see if she was placebo or active.    E.  Bright lights.  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   Development: Met developmental milestones on time.  SOCIAL/ " SPIRITUAL SUPPORT- denies       CULTURAL INFLUENCES/ IMPACT- denies       TRAUMA HISTORY (self-report)- has heard about other family trauma  FEELS SAFE AT HOME- Yes  FAMILY HISTORY-  sister has mood disorder and has attempted suicide multiple times, mother depression and SAD, other sister SAD and depression, aunt bipolar        Medical / Surgical History     Patient Active Problem List   Diagnosis     Hypoestrogenism     Pelvic pain in female     Hymen abnormality       Past Surgical History:   Procedure Laterality Date     ENT SURGERY      tooth procedure     EXAM UNDER ANESTHESIA PELVIC N/A 2/4/2020    Procedure: pap smear, pelvic exam under anesthesia;  Surgeon: Evie Ludwig MD;  Location: [facility] OR     HYMENOTOMY N/A 2/4/2020    Procedure: Hymenectomy;  Surgeon: Evie Ludwig MD;  Location: [facility] OR         Medical Review of Systems                                                                                                 2, 10     GENERAL: Positive for fatigue   HEENT: No changes in hearing or vision, no nose bleeds or other nasal problems  NECK: Negative for lumps, goiter, pain and significant neck swelling  RESPIRATORY: Negative for cough, wheezing and shortness of breath  CARDIOVASCULAR: Negative for chest pain, leg swelling and palpitations  GI: heartburn  : Negative for dysuria, frequency and incontinence  GYN: Positve for sexual concerns or difficulties .  Progesterone BC for persistent mood swings  MUSCULOSKELETAL: Positive for back pain: diagnosis of scoliosis  SKIN: Negative for lesions, rash, and itching.  PSYCH: See HPI  HEMATOLOGY/LYMPHOLOGY Negative for prolonged bleeding, bruising easily, and swollen nodes.  ENDOCRINE: Negative for cold or heat intolerance, polyuria, polydipsia and goiter.  NEURO:  tension headaches      Metals Screen   Yes No Item     x Implanted or lodged metals in body     x Implanted surgical devices     x Metal containing facial or scalp tattoos     x Non removable  hunter   Seizure Screen  Yes No Item     x Current Seizure Disorder?     x History of Seizure?     Does patient have a cochlear implant? __No________  Does patient have any shunts?___no________  Does patient have a pacemaker?___No_______  Does patient have a vagus nerve stimulator?___No______  Does patient have a deep brain stimulator?___No_______  Any other implanted device?___No_____________       Allergy   Cats, Dogs, Grass, and Pineapple     Current Medications     Current Outpatient Medications   Medication Sig Dispense Refill     buPROPion (WELLBUTRIN SR) 150 MG 12 hr tablet        cetirizine HCl 10 MG CAPS        cholecalciferol (VITAMIN D) 200 units (5 mcg) TABS half-tab        conjugated estrogens (PREMARIN) 0.625 MG/GM vaginal cream Place 1 g vaginally twice a week 50 g 1     Drospirenone (SLYND) 4 MG TABS Take 4 mg by mouth daily Active only pills.  Needs 4 packs for 3 month supply 112 tablet 3     FLUoxetine (PROZAC) 20 MG capsule Take 20 mg by mouth daily       FLUoxetine (PROZAC) 40 MG capsule Take 40 mg by mouth daily       melatonin 5 MG tablet Take 5 mg by mouth nightly as needed        norethindrone-ethinyl estradiol (ORTHO-NOVUM 1-35 TAB,NORTREL 1-35 TAB) 1-35 MG-MCG tablet           Vitals                                                                                                                        3, 3     There were no vitals taken for this visit.      Mental Status Exam                                                                                   9, 14 cog gs     Alertness: alert  and oriented  Appearance: well groomed and thin petite build  Behavior/Demeanor: cooperative, with fair  eye contact   Speech: regular rate and rhythm and low volume  Language: intact and no obvious problem  Psychomotor: slowed  Mood: depressed and anxious  Affect: restricted; was congruent to mood; was congruent to content  Thought Process/Associations: unremarkable  Thought Content:  Reports  preoccupations and obsessions ;  Denies suicidal and violent ideation  Perception:  Reports none;  Denies auditory hallucinations and visual hallucinations  Insight: good  Judgment: good  Cognition: (6) does  appear grossly intact; formal cognitive testing was not done  Gait and Station: unremarkable     Labs and Data     Rating Scales:    N/A    PHQ9 Today:  n/a  PHQ 1/28/2020 2/21/2020 11/24/2021   PHQ-9 Total Score 7 6 13   Q9: Thoughts of better off dead/self-harm past 2 weeks Not at all Not at all Not at all         No lab results found.  No lab results found.    Diagnosis and Assessment                                                                             m2, h3     Dina Hoover is a 26 year old female with previous psychiatric history of MDD, recurrent, severe who presents for evaluation of depression and discussion of advanced therapeutic options. Diagnostically,  She has significnat comorbidity of CED and some obsessions that are both partially controlled on her current medication regimen but her depression has been resistant to combination and monotherapy intervention of moderate agressiveness. Sexual dysfunction also impacts function and has not been responsive to trial off of medication    She has a well documented failure of adequate trials of >= 4 antidepressants which represent multiple antidepressant classes as well as augmentation therapies. The patient has completed an adequate dose of individual psychotherapy.     Patient is burdened by her chronic symptoms of depression and her current episode has lasted over 2yrs causing significant psychosocial dysfunction despite multiple trials of psychotropic medications and individual therapy. Due to remaining profound depression and numerous failed previous treatment modalities, the patient is a candidate for rTMS treament and intranasal ketamine.     The risks, benefits, alternatives and potential adverse effects have been explained and are  understood by the patient. Dina Hoover agrees to the treatment plan with the ability to do so. The pt knows to call the clinic for any problems or access emergency care if needed.   Medical considerations relevant to treatment, as noted above, include headaches.   Substance concerns relevant to treatment inlcude n/a.       During the course of these treatments, the patient will be asked not to make any medication changes.   While waiting to initiate these treatments, it is absolutely reasonable to make medication changes, and suggestions (if any) are below.  After treatment is complete, the patient will transfer back to the referring provider.     Suicide Risk Assessment:  Today Dina Hoover reports intermittent passive SI. She has notable risk factors for self-harm, including financial/legal stress. However, risk is mitigated by no h/o suicide attempt, no plan or intent, no h/o risky impulsive behavior, good social support   and stable housing. Therefore, based on all available evidence including the factors cited above, she does not appear to be at imminent risk for self-harm, does not meet criteria for a 72-hr hold, and therefore involuntary hospitalization will not be pursued at this  time.    Today the following issues were addressed:  1) Major depressive disorder, recurrent, severe without psychotic features  2) CED    Clinical Global Impression, Severity of Illness (1-7): 4  Clinical Global Impression, Improvement (1-7): N/A    [place] Prescription Monitoring Program [] review was not needed today.    Drug Interaction Management: Monitoring for adverse effects    Plan                                                                                                                     m2, h3     1) Major depressive disorder, recurrent, severe  -- Medications: Continue current outpatient psychotropic medications    Can consider alternative medication trials to address residual depression luvox or a  TCA may be better balanced to manage both anxiety and depression. If seeking alternatives please plan to establish trial prior to starting TMs nad maitnian medications during the procedure    -- Psychotherapy: none currently.    -- Procedures:    - Pt is approved for an acute series of rTMS   - Coil: F8 or H1  -- Referrals:  Behavioral activation plan for initiation immediately prior to TMS   Sex therapy referral    2) Generalized anxiety disorder  -- Medications: Continue current outpatient psychotropic medications    Can consider further adjustment including  firture titration if tolerated without over activation, and or augmentation with buspirone     Further evaluation of OCD vS OCPD hypersomnia consider referralsignifincta focus on her body shape and abnormal eating behavior. She eventually was  to sleep medicine    RTC: 8 weeks or when TMS is available    CRISIS NUMBERS:   Provided routinely in AVS.    Treatment Risk Statement:  The patient understands the risks, benefits, adverse effects and alternatives. Agrees to treatment with the capacity to do so. No medical contraindications to treatment. Agrees to call clinic for any problems. The patient understands to call 911 or go to the nearest ED if life threatening or urgent symptoms occur.     WHODAS 2.0  TODAY total score = N/A; [a 12-item WHODAS 2.0 assessment was not completed by the pt today and/or recorded in EPIC].      Treatment Summary     Care Team   Outpatient Prescriber: Outpatient Therapist: JENNIFER Psychiatrist: JENNIFER Therapist: FREDA completed by: Pharmacist Consult:     Enoc Carrillo   Formulation (primary and secondary factors guiding treatment plan):   Chief concern: Depression  Primary diagnosis: Major depressive disorder  Secondary diagnoses/factors: Generalized anxiety disorder, sexual dyfunction   Treatment plan (What are the next 1-3 steps in treatment?)   Elements to consider:  Procedures (ECT, TMS, VNS): TMS  If TMS: Which  coil?  No preference    Medications (ketamine, MAOI): N/A  Who will prescribe medication?  Will pt need dietary counseling?  Does pt need to taper (e.g., benzo) or stop (e.g., washout) medication?    Psychotherapy (BA, PE, CPT, DBT):  Are we recommending BA concurrent to a procedure/medication? YES BA concurrent with TMS  Are we recommending another form of therapy? Not with our clinic    Are we recommending concurrent/combination treatments?     How is waitlist affecting plan? Explained wait list length and allows for time for possible additional medication trial     What ancillary supports/resources/therapies need to be organized by our team?   Does pt need an outpatient psychiatric prescriber? No  Does pt need to establish with a therapist? YES BA concurrent with TMS  Are we recommending a therapy not provided by our program (e.g. DBT, PE, CPT)?  Yes sex therapy   Clinical Global Impression - Severity (at DA) / Improvement (at FU)   Considering your total clinical experience with this particular population, how severely ill is the patient at this time?  Score (1-7): 4   What is the plan and rough timeframe for completing care with TRD Program?   What is the primary endpoint/goal of treatment?  Depression response  Timeframe for discharge from our program? 1yr    Who will continue outpatient medication management? current prescriber  Has this been communicated to pt? Yes  Does a care coordination call with outpatient provider(s) need to be scheduled?    Will the pt need ongoing psychotherapy?  Yes referral provided  Will there be ongoing follow-up for ketamine? n/A  Has lab monitoring been ordered? n/A          PROVIDER:  Woody Stubbs MD    Time based billing.  Time on day of service includes:  60min spent face to face with the patient   45 minutes pre-reviewing records  40 minutes preparing consultation note and follow up messages/documentation

## 2021-12-03 ASSESSMENT — PATIENT HEALTH QUESTIONNAIRE - PHQ9: SUM OF ALL RESPONSES TO PHQ QUESTIONS 1-9: 11

## 2021-12-16 ENCOUNTER — TELEPHONE (OUTPATIENT)
Dept: OBGYN | Facility: CLINIC | Age: 26
End: 2021-12-16
Payer: COMMERCIAL

## 2021-12-16 DIAGNOSIS — N94.6 DYSMENORRHEA: Primary | ICD-10-CM

## 2021-12-16 RX ORDER — ACETAMINOPHEN AND CODEINE PHOSPHATE 120; 12 MG/5ML; MG/5ML
0.35 SOLUTION ORAL DAILY
Qty: 84 TABLET | Refills: 3 | Status: SHIPPED | OUTPATIENT
Start: 2021-12-16 | End: 2022-08-01

## 2021-12-16 NOTE — TELEPHONE ENCOUNTER
Patient advised.    Evie Ludwig MD Faragher, Cheri, RN  Caller: Unspecified (Today, 11:41 AM)  I sent a prescription for micronor to her listed pharmacy in [place].             Previous Messages

## 2021-12-16 NOTE — TELEPHONE ENCOUNTER
----- Message from Charisma Hamilton RN sent at 12/16/2021  9:25 AM CST -----  Regarding: slynd tablets  Slynd birth control tablets for the past 2 years - per   New insurance wont cover it now - could I get a different RX that would be covered?  Progestin only please

## 2022-01-10 ENCOUNTER — TELEPHONE (OUTPATIENT)
Dept: OBGYN | Facility: CLINIC | Age: 27
End: 2022-01-10

## 2022-01-10 NOTE — TELEPHONE ENCOUNTER
Gavitoa is on micronor (POPs).  Reviewed that this type of pill does not contain a placebo week.  She is getting some bleeding on this pill, so discussed consistency with timing of pills, and to take care not to miss any

## 2022-01-10 NOTE — TELEPHONE ENCOUNTER
----- Message from Elodia Whittington RN sent at 1/10/2022  3:40 PM CST -----  Regarding: birth control Rx  Requesting new Rx for continuous use

## 2022-01-18 ENCOUNTER — VIRTUAL VISIT (OUTPATIENT)
Dept: PSYCHIATRY | Facility: CLINIC | Age: 27
End: 2022-01-18
Payer: COMMERCIAL

## 2022-01-18 DIAGNOSIS — F33.2 SEVERE EPISODE OF RECURRENT MAJOR DEPRESSIVE DISORDER, WITHOUT PSYCHOTIC FEATURES (H): Primary | ICD-10-CM

## 2022-01-18 DIAGNOSIS — F41.1 GAD (GENERALIZED ANXIETY DISORDER): ICD-10-CM

## 2022-01-18 NOTE — PROGRESS NOTES
"Behavioral Activation Clinician Contact & Progress Note  A Part of the [facility] Treatment Resistant Depression Program    Client: Dina Hoover (1995)     MRN: [id number]  Date:  1/18/22  Diagnosis: MDD  Clinician: Nat Nuno     People present:   Writer  Client: Dina Hoover  Others: NA    Mode of communication: [employer] (HIPAA compliant, secure platform). Patient consented verbally to this mode of therapy today.  Reason for telehealth: COVID-19. This patient visit was converted to a telehealth visit to minimize exposure to COVID-19.    Originating Location (patient location): her girlfriend's family home, located in [place], [place]  Distant Location (provider location): Home office, located in [place], [place], using appropriate privacy considerations and procedures    Session: Informational session to explore therapy options  Length of session:  Start time: 9:00, End time: 9:30    Gaviota (pronounced \"stay-sha\") and I met to discuss if BA or CBT might be good options for her. I explained the two modalities. I offered other ways I might be helpful, such as referral to other therapists or outside resources. I will send Gaviota information on CBT and BA via [employer] so that she can think about it on her own time. She reported that she is interested more in BA and will think about it.    Gaviota described her current depressive symptoms to be hypersomnia, tearfulness, and anxiety. Because of the pandemic, she had a job she was very excited about lined up which fell through, Gaviota felt a great loss because of that. She reported that she \"is in an uncertain place\" with her career now, and has had difficulty finding a job. She is supported financially by [employer] once a week and she lives with her girlfriend, Kira, who is a trans woman and uses she/her pronouns, and Kira's family. Gaviota feels supported by Kira and her immediate family. She has a positive relationship with " "her sister Magdalene, who she often worries about because of her severe depression, suicide attempts, and inability to leave home or learn how to drive. This is a source of anxiety and worry for Gaviota. She deeply misses her family often and COVID was very challenging for her mental health. She also misses living with her cat very much.     Gaviota would like to sleep less, be less tired, have better memory, restructure her negative thoughts about \"wallowing.\"     Mental Status Exam  Alertness: alert   Behavior/Demeanor: cooperative, pleasant and calm  Speech: normal  Language: good.   Mood: description consistent with euthymia  Thought Process/Associations: unremarkable  Thought Content:  Reports none;  Denies none  Perception:  Reports none;  Denies none  Insight: excellent  Judgment: excellent  Cognition: does  appear grossly intact; formal cognitive testing was not done        Nat Nuno     [use CPT codes: 28072 (16-37 min), 25586 (38-52 min), 12121 (53+ min)]        "

## 2022-02-01 ENCOUNTER — VIRTUAL VISIT (OUTPATIENT)
Dept: PSYCHIATRY | Facility: CLINIC | Age: 27
End: 2022-02-01
Payer: COMMERCIAL

## 2022-02-01 DIAGNOSIS — F32.2 MAJOR DEPRESSIVE DISORDER, SINGLE EPISODE, SEVERE (H): Primary | ICD-10-CM

## 2022-02-01 DIAGNOSIS — F41.1 GAD (GENERALIZED ANXIETY DISORDER): ICD-10-CM

## 2022-02-03 NOTE — PROGRESS NOTES
Behavioral Activation Clinician Contact & Progress Note  A Part of the [facility] Treatment Resistant Depression Program    Client: Dina Hoover (1995)     MRN: [id number]  Date:  2/01/22  Diagnosis: MDD, single episode, severe and CED  Clinician: Nat Nuno     People present:   Writer: Nat Nuno  Client: Dina Hoover  Others: none    Mode of communication: [employer] (HIPAA compliant, secure platform). Patient consented verbally to this mode of therapy today.  Reason for telehealth: COVID-19. This patient visit was converted to a telehealth visit to minimize exposure to COVID-19.    Originating Location (patient location): Her partner's home, located in [place], [place]  Distant Location (provider location): Home office, located in [place], [place], using appropriate privacy considerations and procedures    Session: 1 of 12  Length of session:  Start time: 9:00, End time: 10:00    Did the client complete the activation assignment(s) from the previous session: Not Applicable     Behavioral activation strategies utilized:  -Identify activation targets for goal setting (pleasant, valued, goal directed)  -Assign out of session activation assignments  -Monitor completion and problem-solve assignment failure (stimulus control interventions, skills training, contingency management, acceptance/avoidance)    Measures:    Mental Status Exam  Alertness: alert   Behavior/Demeanor: cooperative, pleasant and calm  Speech: normal and regular rate and rhythm  Language: good.   Mood: description consistent with euthymia  Thought Process/Associations: unremarkable  Thought Content:  Reports none;  Denies suicidal and violent ideation and delusions  Perception:  Reports none;  Denies auditory hallucinations and visual hallucinations  Insight: excellent  Judgment: excellent  Cognition: does  appear grossly intact; formal cognitive testing was not done    [place] Protocol Risk  Identification  1) Have you wished you were dead or wished you could go to sleep and not wake up? No  2) Have you actually had any thoughts about killing yourself? No  If YES to 2, answer questions 3, 4, 5, 6  If NO to 2, go directly to question 6  3) Have you thought about how you might do this? N/A  4) Have you had any intension of acting on these thoughts of killing yourself, as opposed to you have the thoughts but you definitely would not act on them? N/A  5) Have you started to work out or worked out the details of how to kill yourself? Do you intent to carry out this plan? N/A  Always Ask Question 6  6) Have you done anything, started to do anything, or prepared to do anything to end your life? No  Examples: collected pills, obtained a gun, gave away valuables, wrote a will or suicide note, held a gun but changed your mind, cut yourself, tried to hang yourself, etc.    PHQ-9  Over the last 2 weeks, how often have you been bothered by any the following problems?    1. Little interest or pleasure in doing things: 2 - More than half the days  2. Feeling down, depressed, or hopeless: 3 - Nearly every day  3. Trouble falling or staying asleep, or sleeping too much: 2 - More than half the days  4. Feeling tired or having little energy: 3 - Nearly every day  5. Poor appetite or overeatin - Not at all  6. Feeling bad about yourself - or that you are a failure or have let yourself or your family down: 2 - More than half the days  7. Trouble concentrating on things, such as reading the newspaper or watching television: 2 - More than half the days  8. Moving or speaking so slowly that other people could have noticed. Or the opposite-being fidgety or restless that you have been moving around a lot more than usual: 2 - More than half the days  9. Thoughts that you would be better off dead, or of hurting yourself in some way: 0 - Not at all    If you checked off any problems, how difficulty have these problems made it  "for you to do your work, take care of things at home, or get along with other people? Extremely difficult    CED-7  Over the last 2 weeks, how often have you been bothered by the following problems?    1. Feeling nervous, anxious or on edge: 2 - More than half the days  2. Not being able to stop or control worryin - More than half the days  3. Worrying too much about different things: 3 - Nearly every day  4. Trouble relaxin - More than half the days  5. Being so restless that it is hard to sit still: 0 - Not at all  6. Becoming easily annoyed or irritable: 3 - Nearly every day  7. Feeling afraid as if something awful might happen: 2 - More than half the days    Assessment/Progress Note:     Gaviota and I reviewed our introductory session from two weeks ago, discussed how last two weeks have gone, and we did a values assessment.     Gaviota's values  Intimate relationshsips- good communication, quality time without distractions, listening and understanding, affection, enjoy watching things and playing games with Kira    Parenting/childcare- establishing a relationship in which the child listens     Family- communication, staying in touch regularly    Social- checking in with affirmations, love and affection    Employment/career- affirmations, ability to trust and be trusted, compassion, desire to feel like she's making a difference and \"contributing to something\"    Education- continued learning through reading and podcasts    Recreation- making time for creativity, values do relaxing but mentally stimulating activities like sewing and video games, seeing an end result with the activity    Plan/Referrals:     Gaviota will do at least 3 days of activity monitoring by our next session. I will send Gaviota a copy of her values assessment on [employer].    Billing for \"Interactive Complexity\"?    No    Nat Nuno     [use CPT codes: 76381 (16-37 min), 57904 (38-52 min), 67358 (53+ min)]      "

## 2022-02-08 ENCOUNTER — VIRTUAL VISIT (OUTPATIENT)
Dept: PSYCHIATRY | Facility: CLINIC | Age: 27
End: 2022-02-08
Payer: COMMERCIAL

## 2022-02-08 DIAGNOSIS — F41.1 GAD (GENERALIZED ANXIETY DISORDER): ICD-10-CM

## 2022-02-08 DIAGNOSIS — F32.2 MAJOR DEPRESSIVE DISORDER, SINGLE EPISODE, SEVERE (H): Primary | ICD-10-CM
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
## 2022-02-15 ENCOUNTER — VIRTUAL VISIT (OUTPATIENT)
Dept: PSYCHIATRY | Facility: CLINIC | Age: 27
End: 2022-02-15
Payer: COMMERCIAL

## 2022-02-15 DIAGNOSIS — F41.1 GAD (GENERALIZED ANXIETY DISORDER): ICD-10-CM

## 2022-02-15 DIAGNOSIS — F32.2 MAJOR DEPRESSIVE DISORDER, SINGLE EPISODE, SEVERE (H): Primary | ICD-10-CM

## 2022-02-15 NOTE — PROGRESS NOTES
Behavioral Activation Clinician Contact & Progress Note  A Part of the [facility] Treatment Resistant Depression Program    Client: Dina Hoover (1995)     MRN: [id number]  Date:  2/15/22  Diagnosis: Single episode of severe MDD, CED  Clinician: Nat Nuno     People present:   Writer  Client: Dina Hoover    Mode of communication: [employer] Well (HIPAA compliant, secure platform). Patient consented verbally to this mode of therapy today.  Reason for telehealth: COVID-19. This patient visit was converted to a telehealth visit to minimize exposure to COVID-19.    Originating Location (patient location): Her partner's home, located in [place], [place]  Distant Location (provider location): Home office, located in [place], [place], using appropriate privacy considerations and procedures    Session: 3 of 12  Length of session:  Start time: 9:00, End time: 10:00    Did the client complete the activation assignment(s) from the previous session: Completed     Behavioral activation strategies utilized:  -Identify activation targets for goal setting (pleasant, valued, goal directed)  -Assign out of session activation assignments  -Monitor completion and problem-solve assignment failure (stimulus control interventions, skills training, contingency management, acceptance/avoidance)    Measures:    Mental Status Exam  Alertness: alert   Behavior/Demeanor: pleasant and agitated  Speech: normal  Language: good.   Mood: description consistent with euthymia  Thought Process/Associations: unremarkable  Thought Content:  Reports suicidal ideation without plan; without intent [details in Interim History];  Denies violent ideation with plan; with intent [details in Interim History]  Perception:  Reports none;  Denies auditory hallucinations and visual hallucinations  Insight: excellent  Judgment: excellent  Cognition: does  appear grossly intact; formal cognitive testing was not  [facility] Protocol Risk Identification  1) Have you wished you were dead or wished you could go to sleep and not wake up? Yes  2) Have you actually had any thoughts about killing yourself? No  If YES to 2, answer questions 3, 4, 5, 6  If NO to 2, go directly to question 6  3) Have you thought about how you might do this? N/A  4) Have you had any intension of acting on these thoughts of killing yourself, as opposed to you have the thoughts but you definitely would not act on them? N/A  5) Have you started to work out or worked out the details of how to kill yourself? Do you intent to carry out this plan? N/A  Always Ask Question 6  6) Have you done anything, started to do anything, or prepared to do anything to end your life? No  Examples: collected pills, obtained a gun, gave away valuables, wrote a will or suicide note, held a gun but changed your mind, cut yourself, tried to hang yourself, etc.    PHQ-9  Over the last 2 weeks, how often have you been bothered by any the following problems?    1. Little interest or pleasure in doing things: 3 - Nearly every day  2. Feeling down, depressed, or hopeless: 3 - Nearly every day  3. Trouble falling or staying asleep, or sleeping too much: 2 - More than half the days  4. Feeling tired or having little energy: 3 - Nearly every day  5. Poor appetite or overeatin - More than half the days  6. Feeling bad about yourself - or that you are a failure or have let yourself or your family down: 3 - Nearly every day  7. Trouble concentrating on things, such as reading the newspaper or watching television: 3 - Nearly every day  8. Moving or speaking so slowly that other people could have noticed. Or the opposite-being fidgety or restless that you have been moving around a lot more than usual: 2 - More than half the days  9. Thoughts that you would be better off dead, or of hurting yourself in some way: 2 - More than half the days    If you checked off any problems, how  difficulty have these problems made it for you to do your work, take care of things at home, or get along with other people? Extremely difficult    CED-7  Over the last 2 weeks, how often have you been bothered by the following problems?    1. Feeling nervous, anxious or on edge: 3 - Nearly every day  2. Not being able to stop or control worrying: 3 - Nearly every day  3. Worrying too much about different things: 2 - More than half the days  4. Trouble relaxin - More than half the days  5. Being so restless that it is hard to sit still: 1 - Several days  6. Becoming easily annoyed or irritable: 3 - Nearly every day  7. Feeling afraid as if something awful might happen: 0 - Not at all    Assessment/Progress Note:     Gaviota reported feeling much more irritable than usual, which she thinks is because of TMS. She was frustrated that Imani continues to take advantage of her work on the paper they created together and does not acknowledge Gaviota's well being or knowledge. I suggested that we roleplay and Gaviota tells me what she would say to Imani if she could as an exercise to release some of her irritability. She was hesitant to do this exercise, so she agreed to do it later on her own.    Gaviota reported that she has been enjoying some creative activities, such as writing and preparing her day planner using colored pens and stickers.    Gaviota followed through with one of two of her goals. She completed the self-appreciation journaling exercise after she worked on a creative project. She did not complete the thought log. We trouble shot ways to overcome obstacles to doing the thought log.    Plan/Referrals:     Gaviota will complete a thought log before and after she drives somewhere 2-3 a week until we see each other next. She will do self-appreciation journaling after completing 3 creative projects each week for the next two weeks.      Nat Nuno     [use CPT codes: 15542 (16-37 min), 66362 (38-52  min), 71167 (53+ min)]

## 2022-02-18 ENCOUNTER — TELEPHONE (OUTPATIENT)
Dept: PSYCHIATRY | Facility: CLINIC | Age: 27
End: 2022-02-18
Payer: COMMERCIAL

## 2022-02-18 NOTE — TELEPHONE ENCOUNTER
Patient called back and asked to be taken off the waitlist for TMS. She is receiving TMS at another facility.

## 2022-03-01 ENCOUNTER — VIRTUAL VISIT (OUTPATIENT)
Dept: PSYCHIATRY | Facility: CLINIC | Age: 27
End: 2022-03-01
Payer: COMMERCIAL

## 2022-03-01 DIAGNOSIS — F33.2 SEVERE EPISODE OF RECURRENT MAJOR DEPRESSIVE DISORDER, WITHOUT PSYCHOTIC FEATURES (H): Primary | ICD-10-CM

## 2022-03-01 DIAGNOSIS — F41.1 GAD (GENERALIZED ANXIETY DISORDER): ICD-10-CM

## 2022-03-01 NOTE — PROGRESS NOTES
Behavioral Activation Clinician Contact & Progress Note  A Part of the [facility] Treatment Resistant Depression Program    Client: Dian Hoover (1995)     MRN: [id number]  Date:  3/01/22  Diagnosis: MDD recurrent episode, severe; CED  Clinician: Nat Nuno     People present:   Writer  Client: Dina Hoover  Others: NA    Mode of communication: [employer] (HIPAA compliant, secure platform). Patient consented verbally to this mode of therapy today.  Reason for telehealth: COVID-19. This patient visit was converted to a telehealth visit to minimize exposure to COVID-19.    Originating Location (patient location): Partner's home, located in [place], [place]  Distant Location (provider location): Home office, located in [place], [place], using appropriate privacy considerations and procedures    Session: 4 of 12  Length of session:  Start time: 9:00, End time: 10:00    Did the client complete the activation assignment(s) from the previous session: Partially Completed     Behavioral activation strategies utilized:  -Identify activation targets for goal setting (pleasant, valued, goal directed)  -Assign out of session activation assignments  -Monitor completion and problem-solve assignment failure (stimulus control interventions, skills training, contingency management, acceptance/avoidance)    Measures:    Mental Status Exam  Alertness: alert   Behavior/Demeanor: pleasant  Speech: normal and regular rate and rhythm  Language: good.   Mood: description consistent with euthymia  Thought Process/Associations: unremarkable  Thought Content:  Reports none;  Denies suicidal and violent ideation and delusions  Perception:  Reports none;  Denies auditory hallucinations and visual hallucinations  Insight: excellent  Judgment: excellent  Cognition: does  appear grossly intact; formal cognitive testing was not done    [place] Protocol Risk Identification  1) Have you wished you were  dead or wished you could go to sleep and not wake up? No  2) Have you actually had any thoughts about killing yourself? No  If YES to 2, answer questions 3, 4, 5, 6  If NO to 2, go directly to question 6  3) Have you thought about how you might do this? N/A  4) Have you had any intension of acting on these thoughts of killing yourself, as opposed to you have the thoughts but you definitely would not act on them? N/A  5) Have you started to work out or worked out the details of how to kill yourself? Do you intent to carry out this plan? N/A  Always Ask Question 6  6) Have you done anything, started to do anything, or prepared to do anything to end your life? No  Examples: collected pills, obtained a gun, gave away valuables, wrote a will or suicide note, held a gun but changed your mind, cut yourself, tried to hang yourself, etc.    PHQ-9  Over the last 2 weeks, how often have you been bothered by any the following problems?    1. Little interest or pleasure in doing things: 2 - More than half the days  2. Feeling down, depressed, or hopeless: 2 - More than half the days  3. Trouble falling or staying asleep, or sleeping too much: 2 - More than half the days  4. Feeling tired or having little energy: 3 - Nearly every day  5. Poor appetite or overeatin - More than half the days  6. Feeling bad about yourself - or that you are a failure or have let yourself or your family down: 2 - More than half the days  7. Trouble concentrating on things, such as reading the newspaper or watching television: 3 - Nearly every day  8. Moving or speaking so slowly that other people could have noticed. Or the opposite-being fidgety or restless that you have been moving around a lot more than usual: 3 - Nearly every day  9. Thoughts that you would be better off dead, or of hurting yourself in some way: 0 - Not at all    If you checked off any problems, how difficulty have these problems made it for you to do your work, take care of  things at home, or get along with other people? Very difficult    CED-7  Over the last 2 weeks, how often have you been bothered by the following problems?    1. Feeling nervous, anxious or on edge: 3 - Nearly every day2  2. Not being able to stop or control worryin - More than half the days  3. Worrying too much about different things: 3 - Nearly every day  4. Trouble relaxing: 3 - Nearly every day  5. Being so restless that it is hard to sit still: 1 - Several days  6. Becoming easily annoyed or irritable: 2 - More than half the days  7. Feeling afraid as if something awful might happen: 1 - Several days    Assessment/Progress Note:     Gaviota has been feeling positive results from the new method of TMS she is receiving. She feels irritable side effects.   Gaviota reported that she got a job recently and quit after one shift. Her manager infantalized her and her coworker overshared about her personal life to Gaviota, which made her very uncomfortable. She was proud of herself for using a thought log about the process of quitting. She recently interviewed or a job at the [employer] which she is excited about and hopes to get the job. The job is flexible which works well for Gaviota's treatment and fear of driving. 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 She stated that she would take that job if it was offered to her. However, this job would not provide her with health insurance, which she expressed high anxiety about.    Gaviota did complete her goals, but not as often as planned.     Plan/Referrals:     Gaviota will continue to use a thought log before driving and during other moments of distress. She will continue self-praise journaling. She will add some somatic mindfulness activities I sent to her via email.       Nat Nuno     [use CPT codes: 89105 (16-37 min), 09703 (38-52 min), 09544 (53+ min)]

## 2022-03-15 ENCOUNTER — VIRTUAL VISIT (OUTPATIENT)
Dept: PSYCHIATRY | Facility: CLINIC | Age: 27
End: 2022-03-15
Payer: COMMERCIAL

## 2022-03-15 DIAGNOSIS — F41.1 GAD (GENERALIZED ANXIETY DISORDER): ICD-10-CM

## 2022-03-15 DIAGNOSIS — F33.2 SEVERE EPISODE OF RECURRENT MAJOR DEPRESSIVE DISORDER, WITHOUT PSYCHOTIC FEATURES (H): Primary | ICD-10-CM

## 2022-03-15 NOTE — PROGRESS NOTES
Behavioral Activation Clinician Contact & Progress Note  A Part of the [facility] Treatment Resistant Depression Program    Client: Dina Hoover (1995)     MRN: [id number]  Date:  3/15/22  Diagnosis: MDD, severe, recurrent episode without psychotic features; CED  Clinician: Nat Nuno     People present:   Writer  Client: Dina Hoover  Others: NA    Mode of communication: [employer] (HIPAA compliant, secure platform). Patient consented verbally to this mode of therapy today.  Reason for telehealth: COVID-19. This patient visit was converted to a telehealth visit to minimize exposure to COVID-19.    Originating Location (patient location):Her home, located in [place], [place]  Distant Location (provider location): Home office, located in [place], [place], using appropriate privacy considerations and procedures    Session: 5 of 12  Length of session:  Start time: 9:00, End time: 10:00    Did the client complete the activation assignment(s) from the previous session: Partially Completed     Behavioral activation strategies utilized:  -Identify activation targets for goal setting (pleasant, valued, goal directed)  -Assign out of session activation assignments  -Monitor completion and problem-solve assignment failure (stimulus control interventions, skills training, contingency management, acceptance/avoidance)    Measures:    Mental Status Exam  Alertness: alert   Behavior/Demeanor: cooperative and pleasant  Speech: regular rate and rhythm  Language: good.   Mood: description consistent with euthymia  Thought Process/Associations: unremarkable  Thought Content:  Reports suicidal ideation;  Denies violent ideation  Perception:  Reports none;  Denies auditory hallucinations and visual hallucinations  Insight: excellent  Judgment: excellent  Cognition: does  appear grossly intact; formal cognitive testing was not done    [place] Protocol Risk Identification  1) Have you wished  you were dead or wished you could go to sleep and not wake up? Yes  2) Have you actually had any thoughts about killing yourself? No  If YES to 2, answer questions 3, 4, 5, 6  If NO to 2, go directly to question 6  3) Have you thought about how you might do this? N/A  4) Have you had any intension of acting on these thoughts of killing yourself, as opposed to you have the thoughts but you definitely would not act on them? N/A  5) Have you started to work out or worked out the details of how to kill yourself? Do you intent to carry out this plan? N/A  Always Ask Question 6  6) Have you done anything, started to do anything, or prepared to do anything to end your life? No  Examples: collected pills, obtained a gun, gave away valuables, wrote a will or suicide note, held a gun but changed your mind, cut yourself, tried to hang yourself, etc.    PHQ-9  Over the last 2 weeks, how often have you been bothered by any the following problems?    1. Little interest or pleasure in doing things: 2 - More than half the days  2. Feeling down, depressed, or hopeless: 2 - More than half the days  3. Trouble falling or staying asleep, or sleeping too much: 0 - Not at all  4. Feeling tired or having little energy: 2 - More than half the days  5. Poor appetite or overeatin - Several days  6. Feeling bad about yourself - or that you are a failure or have let yourself or your family down: 2 - More than half the days  7. Trouble concentrating on things, such as reading the newspaper or watching television: 1 - Several days  8. Moving or speaking so slowly that other people could have noticed. Or the opposite-being fidgety or restless that you have been moving around a lot more than usual: 2 - More than half the days  9. Thoughts that you would be better off dead, or of hurting yourself in some way: 1 - Several days    If you checked off any problems, how difficulty have these problems made it for you to do your work, take care of  "things at home, or get along with other people? Extremely difficult    CED-7  Over the last 2 weeks, how often have you been bothered by the following problems?    1. Feeling nervous, anxious or on edge: 2 - More than half the days  2. Not being able to stop or control worryin - More than half the days  3. Worrying too much about different things: 2  4. Trouble relaxin - Several days  5. Being so restless that it is hard to sit still: 0 - Not at all  6. Becoming easily annoyed or irritable: 1 - Several days  7. 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 She did not complete goals because of stress. We discussed making the goals more accessible and doable. I offered to advocate for her regarding her TMS concerns by contacting her doctor and reporting on her progress in our program.    Plan/Referrals:     Gaviota will complete an activity monitoring sheet on her phone instead of on paper.    Billing for \"Interactive Complexity\"?    No    Nat Nuno     [use CPT codes: 19468 (16-37 min), 06627 (38-52 min), 98928 (53+ min)]    "

## 2022-04-05 ENCOUNTER — VIRTUAL VISIT (OUTPATIENT)
Dept: PSYCHIATRY | Facility: CLINIC | Age: 27
End: 2022-04-05
Payer: COMMERCIAL

## 2022-04-05 DIAGNOSIS — F33.2 SEVERE EPISODE OF RECURRENT MAJOR DEPRESSIVE DISORDER, WITHOUT PSYCHOTIC FEATURES (H): Primary | ICD-10-CM

## 2022-04-05 DIAGNOSIS — F41.1 GAD (GENERALIZED ANXIETY DISORDER): ICD-10-CM

## 2022-04-05 NOTE — PROGRESS NOTES
Behavioral Activation Clinician Contact & Progress Note  A Part of the [facility] Treatment Resistant Depression Program    Client: Dina Hoover (1995)     MRN: [id number]  Date:  4/05/22  Diagnosis: MDD, severe, recurrent episode; CED  Clinician: Nat Nuno     People present:   Writer  Client: Dina Hoover  Others: NA    Mode of communication: [employer] (HIPAA compliant, secure platform). Patient consented verbally to this mode of therapy today.  Reason for telehealth: COVID-19. This patient visit was converted to a telehealth visit to minimize exposure to COVID-19.    Originating Location (patient location): Her home, located in [place], [place]  Distant Location (provider location): Home office, located in [place], [place], using appropriate privacy considerations and procedures    Session: 6 of 12  Length of session:  Start time: 9:00, End time: 10:00    Did the client complete the activation assignment(s) from the previous session: Partially Completed     Behavioral activation strategies utilized:  -Identify activation targets for goal setting (pleasant, valued, goal directed)  -Assign out of session activation assignments  -Monitor completion and problem-solve assignment failure (stimulus control interventions, skills training, contingency management, acceptance/avoidance)    Measures:    Mental Status Exam  Alertness: alert   Behavior/Demeanor: pleasant  Speech: normal and regular rate and rhythm  Language: good.   Mood: description consistent with euthymia  Thought Process/Associations: unremarkable  Thought Content:  Reports none;  Denies suicidal and violent ideation  Perception:  Reports none;  Denies auditory hallucinations and visual hallucinations  Insight: excellent  Judgment: excellent  Cognition: does  appear grossly intact; formal cognitive testing was not done    [place] Protocol Risk Identification  1) Have you wished you were dead or wished you  could go to sleep and not wake up? No  2) Have you actually had any thoughts about killing yourself? No  If YES to 2, answer questions 3, 4, 5, 6  If NO to 2, go directly to question 6  3) Have you thought about how you might do this? No  4) Have you had any intension of acting on these thoughts of killing yourself, as opposed to you have the thoughts but you definitely would not act on them? No  5) Have you started to work out or worked out the details of how to kill yourself? Do you intent to carry out this plan? No  Always Ask Question 6  6) Have you done anything, started to do anything, or prepared to do anything to end your life? No  Examples: collected pills, obtained a gun, gave away valuables, wrote a will or suicide note, held a gun but changed your mind, cut yourself, tried to hang yourself, etc.    PHQ-9  Over the last 2 weeks, how often have you been bothered by any the following problems?    1. Little interest or pleasure in doing things: 1 - Several days  2. Feeling down, depressed, or hopeless: 2 - More than half the days  3. Trouble falling or staying asleep, or sleeping too much: 0 - Not at all  4. Feeling tired or having little energy: 2 - More than half the days  5. Poor appetite or overeatin - Several days  6. Feeling bad about yourself - or that you are a failure or have let yourself or your family down: 2 - More than half the days  7. Trouble concentrating on things, such as reading the newspaper or watching television: 1 - Several days  8. Moving or speaking so slowly that other people could have noticed. Or the opposite-being fidgety or restless that you have been moving around a lot more than usual: 2 - More than half the days  9. Thoughts that you would be better off dead, or of hurting yourself in some way: 0 - Not at all    If you checked off any problems, how difficulty have these problems made it for you to do your work, take care of things at home, or get along with other people?  "Somewhat difficult    CED-7  Over the last 2 weeks, how often have you been bothered by the following problems?    1. Feeling nervous, anxious or on edge: 1 - Several days  2. Not being able to stop or control worryin - Several days  3. Worrying too much about different things: 2 - More than half the days  4. Trouble relaxin - Several days  5. Being so restless that it is hard to sit still: 0 - Not at all  6. Becoming easily annoyed or irritable: 1 - Several days  7. Feeling afraid as if something awful might happen: 0 - Not at all    Assessment/Progress Note:     Gaviota reported good news; she got a part time job at a nearby [employer] and will be spending more time with a loved one who is in need of support. Regarding goals completion, Gaviota spoke with her doctor about her TMS treatment concerns and they were resolved, and she used the thought log structure to conceptualize a thought error she had about fearing judgement from the [employer] that hired her because she is not [employer]. She reported it was helpful to process her thoughts in this way.     Gaviota and I discussed the importance of continuing to try to complete a thought log at least once this week. She feels motivated to try it again.    Gaviota and SOPHIE began the WRAP (Wellness Recovery Action Plan) together in preparation for termination. The entire completed WRAP will be noted in our final note and then shared with her to save for her own use going forward.     Plan/Referrals:     Gaviota will do a thought log once this week.     Billing for \"Interactive Complexity\"?    No    Nat Nuno     [use CPT codes: 37117 (16-37 min), 56720 (38-52 min), 69907 (53+ min)]      "

## 2022-04-12 ENCOUNTER — VIRTUAL VISIT (OUTPATIENT)
Dept: PSYCHIATRY | Facility: CLINIC | Age: 27
End: 2022-04-12
Payer: COMMERCIAL

## 2022-04-12 DIAGNOSIS — F41.1 GAD (GENERALIZED ANXIETY DISORDER): ICD-10-CM

## 2022-04-12 DIAGNOSIS — F33.2 SEVERE EPISODE OF RECURRENT MAJOR DEPRESSIVE DISORDER, WITHOUT PSYCHOTIC FEATURES (H): Primary | ICD-10-CM

## 2022-04-12 NOTE — PROGRESS NOTES
Behavioral Activation Clinician Contact & Progress Note  A Part of the [facility] Treatment Resistant Depression Program    Client: Dina Hoover (1995)     MRN: [id number]  Date:  4/12/22  Diagnosis: MDD severe, recurrent episode; CED  Clinician: Nat Nuno     People present:   Writer  Client: Dina Hoover  Others: NA    Mode of communication: [employer] Well (HIPAA compliant, secure platform). Patient consented verbally to this mode of therapy today.  Reason for telehealth: COVID-19. This patient visit was converted to a telehealth visit to minimize exposure to COVID-19.    Originating Location (patient location): Her partner's homr, located in [place], [place]  Distant Location (provider location): Home office, located in [place], [place], using appropriate privacy considerations and procedures    Session: 7   Length of session:  Start time: 9:00, End time: 10:00    Did the client complete the activation assignment(s) from the previous session: Nothing Completed     Behavioral activation strategies utilized:  -Identify activation targets for goal setting (pleasant, valued, goal directed)  -Assign out of session activation assignments  -Monitor completion and problem-solve assignment failure (stimulus control interventions, skills training, contingency management, acceptance/avoidance)    Measures:    Mental Status Exam  Alertness: alert   Behavior/Demeanor: pleasant  Speech: normal and regular rate and rhythm  Language: good.   Mood: description consistent with euthymia  Thought Process/Associations: unremarkable  Thought Content:  Reports none;  Denies suicidal and violent ideation  Perception:  Reports none;  Denies auditory hallucinations and visual hallucinations  Insight: excellent  Judgment: excellent  Cognition: does  appear grossly intact; formal cognitive testing was not done    [place] Protocol Risk Identification  1) Have you wished you were dead or wished you  could go to sleep and not wake up? No  2) Have you actually had any thoughts about killing yourself? No  If YES to 2, answer questions 3, 4, 5, 6  If NO to 2, go directly to question 6  3) Have you thought about how you might do this? N/A  4) Have you had any intension of acting on these thoughts of killing yourself, as opposed to you have the thoughts but you definitely would not act on them? N/A  5) Have you started to work out or worked out the details of how to kill yourself? Do you intent to carry out this plan? N/A  Always Ask Question 6  6) Have you done anything, started to do anything, or prepared to do anything to end your life? No  Examples: collected pills, obtained a gun, gave away valuables, wrote a will or suicide note, held a gun but changed your mind, cut yourself, tried to hang yourself, etc.    PHQ-9  Over the last 2 weeks, how often have you been bothered by any the following problems?    1. Little interest or pleasure in doing things: 1 - Several days  2. Feeling down, depressed, or hopeless: 2 - More than half the days  3. Trouble falling or staying asleep, or sleeping too much: 0 - Not at all  4. Feeling tired or having little energy: 2 - More than half the days  5. Poor appetite or overeatin - Several days  6. Feeling bad about yourself - or that you are a failure or have let yourself or your family down: 2 - More than half the days  7. Trouble concentrating on things, such as reading the newspaper or watching television: 2 - More than half the days  8. Moving or speaking so slowly that other people could have noticed. Or the opposite-being fidgety or restless that you have been moving around a lot more than usual: 2 - More than half the days  9. Thoughts that you would be better off dead, or of hurting yourself in some way: 0 - Not at all    If you checked off any problems, how difficulty have these problems made it for you to do your work, take care of things at home, or get along with  "other people? Very difficult    CED-7  Over the last 2 weeks, how often have you been bothered by the following problems?    1. Feeling nervous, anxious or on edge: 2 - More than half the days  2. Not being able to stop or control worryin - More than half the days  3. Worrying too much about different things: 2 - More than half the days  4. Trouble relaxing: 3 - Nearly every day (she thinks this is due to new medication)  5. Being so restless that it is hard to sit still: 2 - More than half the days (this as well)  6. Becoming easily annoyed or irritable: 1 - Several days  7. Feeling afraid as if something awful might happen: 0 - Not at all    Assessment/Progress Note:     Gaviota reported feeling upset that her insurance company is not going to approve more TMS treatments, despite her psychiatrist's wishes. She hopes to try Ketamine or more TMS when she is on her partner's health insurance in July.    She has been experiencing restlessness in her legs from her new medication (Latuda) prescribed by her outside psychiatrist. She got a massage over the weekend to help with back pain associated with the restlessness and found some relief.      She has been enjoying babysitting and spending time with her partner's cousin, who invited her rock climbing soon which she is looking forward to. She will begin her new  job on the , which she is also looking forward to.     Kira did not complete a thought log and so we decided that we won't try to set that as a goal anymore. We will stick to BA-type goals. See below.    Plan/Referrals:     Kira will contact her outside psychiatrist to schedule a meeting about her Latuda and talk more about TMS options, do some stretching for her back daily, and get outside more. Walking to her new job will be helpful for facilitating the latter.    Billing for \"Interactive Complexity\"?    No    Nat Nuno     [use CPT codes: 42965 (16-37 min), 08379 " (38-52 min), 66596 (53+ min)]

## 2022-04-19 ENCOUNTER — VIRTUAL VISIT (OUTPATIENT)
Dept: PSYCHIATRY | Facility: CLINIC | Age: 27
End: 2022-04-19
Payer: COMMERCIAL

## 2022-04-19 DIAGNOSIS — F33.2 SEVERE EPISODE OF RECURRENT MAJOR DEPRESSIVE DISORDER, WITHOUT PSYCHOTIC FEATURES (H): Primary | ICD-10-CM

## 2022-04-19 DIAGNOSIS — F41.1 GAD (GENERALIZED ANXIETY DISORDER): ICD-10-CM

## 2022-04-19 NOTE — PROGRESS NOTES
Behavioral Activation Clinician Contact & Progress Note  A Part of the [facility] Treatment Resistant Depression Program    Client: Dina Hoover (1995)     MRN: [id number]  Date:  4/19/22  Diagnosis: MDD recurrent episode, severe  Clinician: Nat Nuno     People present:   Writer  Client: Dina Hoover  Others: NA    Mode of communication: [employer] (HIPAA compliant, secure platform). Patient consented verbally to this mode of therapy today.  Reason for telehealth: COVID-19. This patient visit was converted to a telehealth visit to minimize exposure to COVID-19.    Originating Location (patient location): Partner's parents home, located in [place], [place]  Distant Location (provider location): Psychiatry Clinic, [facility]    Session: 8  Length of session:  Start time: 9:00, End time: 10:00    Did the client complete the activation assignment(s) from the previous session: Partially Completed     Behavioral activation strategies utilized:  -Identify activation targets for goal setting (pleasant, valued, goal directed)  -Assign out of session activation assignments  -Monitor completion and problem-solve assignment failure (stimulus control interventions, skills training, contingency management, acceptance/avoidance)    Measures: Patient report    Mental Status Exam  Alertness: alert   Behavior/Demeanor: calm  Speech: normal and regular rate and rhythm  Language: good.   Mood: depressed and worried  Thought Process/Associations: unremarkable  Thought Content:  Reports none;  Denies suicidal and violent ideation  Perception:  Reports none;  Denies auditory hallucinations and visual hallucinations  Insight: good  Judgment: good  Cognition: does  appear grossly intact; formal cognitive testing was not done    [place] Protocol Risk Identification  1) Have you wished you were dead or wished you could go to sleep and not wake up? No  2) Have you actually had any thoughts about  killing yourself? No  If YES to 2, answer questions 3, 4, 5, 6  If NO to 2, go directly to question 6  3) Have you thought about how you might do this? N/A  4) Have you had any intension of acting on these thoughts of killing yourself, as opposed to you have the thoughts but you definitely would not act on them? N/A  5) Have you started to work out or worked out the details of how to kill yourself? Do you intent to carry out this plan? N/A  Always Ask Question 6  6) Have you done anything, started to do anything, or prepared to do anything to end your life? No  Examples: collected pills, obtained a gun, gave away valuables, wrote a will or suicide note, held a gun but changed your mind, cut yourself, tried to hang yourself, etc.    PHQ-9  Over the last 2 weeks, how often have you been bothered by any the following problems?    1. Little interest or pleasure in doing things: 2 - More than half the days  2. Feeling down, depressed, or hopeless: 2 - More than half the days  3. Trouble falling or staying asleep, or sleeping too much: 1 - Several days  4. Feeling tired or having little energy: 2 - More than half the days  5. Poor appetite or overeatin - Not at all  6. Feeling bad about yourself - or that you are a failure or have let yourself or your family down: 1 - Several days  7. Trouble concentrating on things, such as reading the newspaper or watching television: 2 - More than half the days  8. Moving or speaking so slowly that other people could have noticed. Or the opposite-being fidgety or restless that you have been moving around a lot more than usual: 2 - More than half the days  9. Thoughts that you would be better off dead, or of hurting yourself in some way: 0 - Not at all    If you checked off any problems, how difficulty have these problems made it for you to do your work, take care of things at home, or get along with other people? Very difficult    CED-7  Over the last 2 weeks, how often have you  "been bothered by the following problems?    1. Feeling nervous, anxious or on edge: 2 - More than half the days  2. Not being able to stop or control worryin - More than half the days  3. Worrying too much about different things: 2 - More than half the days  4. Trouble relaxin - Several days  5. Being so restless that it is hard to sit still: 0 - Not at all  6. Becoming easily annoyed or irritable: 0 - Not at all  7. Feeling afraid as if something awful might happen: 1 - Several days    Assessment/Progress Note:     Gaviota reported having had a good past week overall, but the past 12-sh hours had been quite bad. Her girlfriend Kira struggles with body image because of a poorly done vaginoplasty, on top of the stress of being a transwoman in general. Gaviota reported that Kira is angry with her for \"wanting her to be alive when her existence is suffering.\" Gaviota carries this burden and wishes she could help Kira, but Kira is \"stubborn\" and has a hard time seeing that she does have happy moments. Kira has also told Gaviota that Gaviota's very petite stature makes her feel masculine. I validated Gaviota by letting her know that if Kira was truly resentful for Gaviota's size, she would leave her, but the main issue is that Kira is projecting her unacceptance of her body onto Gaviota. I also told Gaviota that this is not fair, and that Kira's insecurity is not her fault. Gaviota became tearful. We discussed ways to cope with this and how Gaviota can help Kira get through this current crisis as well. I agreed to send Gaviota LGBTQIA+ resources for herself and Kira as well as information about [facility].    Gaviota completed her goals of reaching out to Dr. Mascorro and doing light stretching and movement to ease her back pain. She did not complete her goal of getting outside, only because it was not pleasant out over the last week. She plans to do so in the next week because it will be warmer.    Plan/Referrals:     Gaviota will " "consider her next steps around staying in our program or being referred out for other services. She will get outside for walks at least once in the next week.    Billing for \"Interactive Complexity\"?    No    Nat Nuno     [use CPT codes: 23801 (16-37 min), 50989 (38-52 min), 87772 (53+ min)]    "

## 2022-04-26 ENCOUNTER — VIRTUAL VISIT (OUTPATIENT)
Dept: PSYCHIATRY | Facility: CLINIC | Age: 27
End: 2022-04-26
Payer: COMMERCIAL

## 2022-04-26 DIAGNOSIS — F41.1 GAD (GENERALIZED ANXIETY DISORDER): ICD-10-CM

## 2022-04-26 DIAGNOSIS — F33.2 SEVERE EPISODE OF RECURRENT MAJOR DEPRESSIVE DISORDER, WITHOUT PSYCHOTIC FEATURES (H): Primary | ICD-10-CM

## 2022-04-26 NOTE — PROGRESS NOTES
Behavioral Activation Clinician Contact & Progress Note  A Part of the [facility] Treatment Resistant Depression Program    Client: Dina Hoover (1995)     MRN: [id number]  Date:  4/26/22  Diagnosis: MDD, severe, recurrent episode; CED  Clinician: Nat Nuno     People present:   Writer  Client: Dina Hoover  Others: NA    Mode of communication: [employer] (HIPAA compliant, secure platform). Patient consented verbally to this mode of therapy today.  Reason for telehealth: COVID-19. This patient visit was converted to a telehealth visit to minimize exposure to COVID-19.    Originating Location (patient location): Her partner's home, located in [place], [place]  Distant Location (provider location): Psychiatry Clinic, [facility]    Session: 9  Length of session:  Start time: 9:00, End time: 10:00    Did the client complete the activation assignment(s) from the previous session: Completed     Behavioral activation strategies utilized:  -Identify activation targets for goal setting (pleasant, valued, goal directed)  -Assign out of session activation assignments  -Monitor completion and problem-solve assignment failure (stimulus control interventions, skills training, contingency management, acceptance/avoidance)    Measures:    Mental Status Exam  Alertness: alert   Behavior/Demeanor: pleasant and calm  Speech: normal and regular rate and rhythm  Language: good.   Mood: description consistent with euthymia  Thought Process/Associations: unremarkable  Thought Content:  Reports none;  Denies suicidal and violent ideation  Perception:  Reports none;  Denies auditory hallucinations and visual hallucinations  Insight: excellent  Judgment: excellent  Cognition: does  appear grossly intact; formal cognitive testing was not done    [place] Protocol Risk Identification  1) Have you wished you were dead or wished you could go to sleep and not wake up? No  2) Have you actually had any  thoughts about killing yourself? No  If YES to 2, answer questions 3, 4, 5, 6  If NO to 2, go directly to question 6  3) Have you thought about how you might do this? N/A  4) Have you had any intension of acting on these thoughts of killing yourself, as opposed to you have the thoughts but you definitely would not act on them? N/A  5) Have you started to work out or worked out the details of how to kill yourself? Do you intent to carry out this plan? N/A  Always Ask Question 6  6) Have you done anything, started to do anything, or prepared to do anything to end your life? No  Examples: collected pills, obtained a gun, gave away valuables, wrote a will or suicide note, held a gun but changed your mind, cut yourself, tried to hang yourself, etc.    PHQ-9  Over the last 2 weeks, how often have you been bothered by any the following problems?    1. Little interest or pleasure in doing things: 1 - Several days  2. Feeling down, depressed, or hopeless: 2 - More than half the days  3. Trouble falling or staying asleep, or sleeping too much: 1 - Several days  4. Feeling tired or having little energy: 2 - More than half the days  5. Poor appetite or overeatin - Not at all  6. Feeling bad about yourself - or that you are a failure or have let yourself or your family down: 1 - Several days  7. Trouble concentrating on things, such as reading the newspaper or watching television: 2 - More than half the days  8. Moving or speaking so slowly that other people could have noticed. Or the opposite-being fidgety or restless that you have been moving around a lot more than usual: 2 - More than half the days  9. Thoughts that you would be better off dead, or of hurting yourself in some way: 0 - Not at all    If you checked off any problems, how difficulty have these problems made it for you to do your work, take care of things at home, or get along with other people? Very difficult    CED-7  Over the last 2 weeks, how often have  "you been bothered by the following problems?    1. Feeling nervous, anxious or on edge: 2 - More than half the days  2. Not being able to stop or control worryin - More than half the days  3. Worrying too much about different things: 2 - More than half the days  4. Trouble relaxin - More than half the days  5. Being so restless that it is hard to sit still: 1 - Several days  6. Becoming easily annoyed or irritable: 1 - Several days  7. Feeling afraid as if something awful might happen: 2 - More than half the days    Assessment/Progress Note:     Gaviota reported having had a better week than the week before we saw each other last. Her job is going well and she recently spent quality time with family, which is important to her.     She completed her goals of going for walks and thinking about her next treatment steps. Gaviota would like to consider further seeing someone in our clinic. She also wants to reach out to some clinics I told her about in a [facility] message last week that are LGBTQIA+ relevant.    Gaviota and I also worked on her WRAP which I will mail to her.    Gaviota remarked that this therapy has been very helpful and she likes the format of spending half the session \"venting\" and processing, and the other half focusing on goals.    Plan/Referrals:     Gaviota will reach out to the clinic if she wants to see another therapist for BA or CBT.    Billing for \"Interactive Complexity\"?    No    Nat Nuno     [use CPT codes: 02777 (16-37 min), 97540 (38-52 min), 76571 (53+ min)]      "

## 2022-05-22 ENCOUNTER — HEALTH MAINTENANCE LETTER (OUTPATIENT)
Age: 27
End: 2022-05-22

## 2022-08-01 DIAGNOSIS — N94.6 DYSMENORRHEA: ICD-10-CM

## 2022-08-01 RX ORDER — ACETAMINOPHEN AND CODEINE PHOSPHATE 120; 12 MG/5ML; MG/5ML
0.35 SOLUTION ORAL DAILY
Qty: 84 TABLET | Refills: 1 | Status: SHIPPED | OUTPATIENT
Start: 2022-08-01 | End: 2022-09-28

## 2022-08-01 NOTE — TELEPHONE ENCOUNTER
Received refill request for OCPs.  Last PAP 2/2020.     Refilled per protocol and message sent to patient reminding to schedule annual exam/PAP in Feb 2023

## 2022-09-18 ENCOUNTER — HEALTH MAINTENANCE LETTER (OUTPATIENT)
Age: 27
End: 2022-09-18

## 2022-09-28 ENCOUNTER — TELEPHONE (OUTPATIENT)
Dept: OBGYN | Facility: CLINIC | Age: 27
End: 2022-09-28

## 2022-09-28 DIAGNOSIS — N94.6 DYSMENORRHEA: ICD-10-CM

## 2022-09-28 RX ORDER — ACETAMINOPHEN AND CODEINE PHOSPHATE 120; 12 MG/5ML; MG/5ML
0.35 SOLUTION ORAL DAILY
Qty: 84 TABLET | Refills: 0 | Status: SHIPPED | OUTPATIENT
Start: 2022-09-28 | End: 2022-10-07

## 2022-09-28 NOTE — TELEPHONE ENCOUNTER
----- Message from Shazia Le RN sent at 9/27/2022  1:39 PM CDT -----  Regarding: please refill birth cntrol

## 2022-10-05 ENCOUNTER — TELEPHONE (OUTPATIENT)
Dept: OBGYN | Facility: CLINIC | Age: 27
End: 2022-10-05

## 2022-10-05 DIAGNOSIS — N94.6 DYSMENORRHEA: ICD-10-CM

## 2022-10-05 NOTE — TELEPHONE ENCOUNTER
[facility] Call Center    Phone Message    May a detailed message be left on voicemail: yes     Reason for Call: Medication Refill Request    Has the patient contacted the pharmacy for the refill? Yes   Name of medication being requested: norethindrone (MICRONOR) 0.35 MG tablet     Provider who prescribed the medication: Kumar  [facility]   Date medication is needed: ASAP    Patients prescription was sent to wrong pharmacy. [facility] to [facility] on file. Needing 90  Day supply and up to 3 refills. Thank you       Action Taken: Message routed to:  [facility]    Travel Screening: Not Applicable

## 2022-10-07 RX ORDER — ACETAMINOPHEN AND CODEINE PHOSPHATE 120; 12 MG/5ML; MG/5ML
0.35 SOLUTION ORAL DAILY
Qty: 84 TABLET | Refills: 0 | Status: SHIPPED | OUTPATIENT
Start: 2022-10-07 | End: 2022-12-14

## 2022-12-14 DIAGNOSIS — N94.6 DYSMENORRHEA: ICD-10-CM

## 2022-12-14 RX ORDER — ACETAMINOPHEN AND CODEINE PHOSPHATE 120; 12 MG/5ML; MG/5ML
0.35 SOLUTION ORAL DAILY
Qty: 84 TABLET | Refills: 3 | Status: SHIPPED | OUTPATIENT
Start: 2022-12-14 | End: 2023-12-07

## 2022-12-14 NOTE — TELEPHONE ENCOUNTER
Refilled her birth control per protocol. Last PAP on 2-4-20 and was negative.     Will send a [facility] message letting the patient know she will be due for a PAP next year.

## 2023-06-04 ENCOUNTER — HEALTH MAINTENANCE LETTER (OUTPATIENT)
Age: 28
End: 2023-06-04

## 2023-12-07 ENCOUNTER — MYC MEDICAL ADVICE (OUTPATIENT)
Dept: OBGYN | Facility: CLINIC | Age: 28
End: 2023-12-07
Payer: COMMERCIAL

## 2023-12-07 DIAGNOSIS — N94.6 DYSMENORRHEA: ICD-10-CM

## 2023-12-07 RX ORDER — ACETAMINOPHEN AND CODEINE PHOSPHATE 120; 12 MG/5ML; MG/5ML
0.35 SOLUTION ORAL DAILY
Qty: 84 TABLET | Refills: 3 | Status: SHIPPED | OUTPATIENT
Start: 2023-12-07 | End: 2024-01-31

## 2023-12-07 NOTE — TELEPHONE ENCOUNTER
Refill request for norethindrone received. Pt last seen in clinic 2/21/2020, unable to fill per RN protocol. Sent appointment reminder, routing to provider for review.

## 2024-01-08 ENCOUNTER — TELEPHONE (OUTPATIENT)
Dept: OBGYN | Facility: CLINIC | Age: 29
End: 2024-01-08
Payer: COMMERCIAL

## 2024-01-08 DIAGNOSIS — F41.9 ANXIETY DUE TO INVASIVE PROCEDURE: ICD-10-CM

## 2024-01-08 DIAGNOSIS — R10.2 PELVIC PAIN IN FEMALE: Primary | ICD-10-CM

## 2024-01-08 NOTE — TELEPHONE ENCOUNTER
Health Call Center    Phone Message    May a detailed message be left on voicemail: yes     Reason for Call: Other: pt calling in wanting to know if she can be prescribe something for the pap. Pt states she had to be put under anesthesia for her last pap for anxiety and pain wise. pt is also wondering if she could speak to dr. Ludwig about some alternative pelvic therapy options. Pt mentioned [facility] since pt doesn't drive. Pt said that would be an easier route for her. Please call pt to discuss further thank you!     Action Taken: Message routed to:  Other: obgyn    Travel Screening: Not Applicable

## 2024-01-09 RX ORDER — DIAZEPAM 5 MG
5 TABLET ORAL
Qty: 1 TABLET | Refills: 0 | Status: SHIPPED | OUTPATIENT
Start: 2024-01-09

## 2024-01-09 NOTE — TELEPHONE ENCOUNTER
Spoke with Dr. Ludwig about how we would like to proceed with this visit. We'll schedule Gaviota for 30m earlier than planned so she can do the appointment, take the valium, and then get the pap 30m later. Pt agreeable to plan. Routing valium to Dr. Ludwig for approval.

## 2024-01-31 ENCOUNTER — OFFICE VISIT (OUTPATIENT)
Dept: OBGYN | Facility: CLINIC | Age: 29
End: 2024-01-31
Attending: OBSTETRICS & GYNECOLOGY
Payer: COMMERCIAL

## 2024-01-31 VITALS
DIASTOLIC BLOOD PRESSURE: 72 MMHG | SYSTOLIC BLOOD PRESSURE: 103 MMHG | HEIGHT: 60 IN | HEART RATE: 74 BPM | WEIGHT: 99 LBS | BODY MASS INDEX: 19.44 KG/M2

## 2024-01-31 DIAGNOSIS — F52.31 ANORGASMIA OF FEMALE: ICD-10-CM

## 2024-01-31 DIAGNOSIS — Z01.419 ENCOUNTER FOR GYNECOLOGICAL EXAMINATION: ICD-10-CM

## 2024-01-31 DIAGNOSIS — N94.6 DYSMENORRHEA: ICD-10-CM

## 2024-01-31 DIAGNOSIS — Z12.4 CERVICAL CANCER SCREENING: ICD-10-CM

## 2024-01-31 DIAGNOSIS — N94.2 VAGINISMUS: Primary | ICD-10-CM

## 2024-01-31 PROCEDURE — 90715 TDAP VACCINE 7 YRS/> IM: CPT

## 2024-01-31 PROCEDURE — 99385 PREV VISIT NEW AGE 18-39: CPT | Performed by: OBSTETRICS & GYNECOLOGY

## 2024-01-31 PROCEDURE — G0145 SCR C/V CYTO,THINLAYER,RESCR: HCPCS | Performed by: OBSTETRICS & GYNECOLOGY

## 2024-01-31 PROCEDURE — 90471 IMMUNIZATION ADMIN: CPT

## 2024-01-31 PROCEDURE — 99213 OFFICE O/P EST LOW 20 MIN: CPT | Mod: 25 | Performed by: OBSTETRICS & GYNECOLOGY

## 2024-01-31 PROCEDURE — 250N000011 HC RX IP 250 OP 636

## 2024-01-31 RX ORDER — ACETAMINOPHEN AND CODEINE PHOSPHATE 120; 12 MG/5ML; MG/5ML
0.35 SOLUTION ORAL DAILY
Qty: 84 TABLET | Refills: 3 | Status: SHIPPED | OUTPATIENT
Start: 2024-01-31

## 2024-01-31 NOTE — PATIENT INSTRUCTIONS
Thank you for trusting us with your care!     If you need to contact us for questions about:  Symptoms, Scheduling & Medical Questions; Non-urgent (2-3 day response) Marcela message, Urgent (needing response today) [phone] (if after 3:30pm next day response)   Prescriptions: Please call your Pharmacy   Billing: [facility] [phone] or [facility]:[phone]

## 2024-01-31 NOTE — PROGRESS NOTES
[facility]  Gynecology Visit    SUBJECTIVE    Dina Hoover is a 28 year old G0 who is here for an annual examination. She has the following concerns she wants to discuss today:    1) Inability to insert anything into the vagina: This continues to be painful. She did purchase a vaginal dilator but has not yet used it. She is interested in pelvic floor physical therapy but lives in [place] so would be most interested in a location closer to her home. She did have a doctor wonder if in the past LS was contributing, and did have a negative biopsy. She also wonders if vaginal botox would be beneficial. She did try the estrogen cream but this didn't seem to make a difference so she stopped using it. No bowel or bladder concerns.     2) Anorgasmia: She has tried vibrators but isn't able to achieve orgasm. Gaviota does note she has a stressful job. She did for a time stop the antidepressant medications but that didn't seem to help. She does feel it is difficult to talk about sex with her partner.     She continues to use the minipill and is happy to not have a period as previously these were quite painful. She wonders if the pill is contributing to the anorgasmia she is experiencing.     Her LMP is: No LMP recorded. (Menstrual status: Birth Control).    PAST MEDICAL HISTORY  Past Medical History:   Diagnosis Date    Back pain     Depression     Generalized anxiety disorder        MEDICATIONS  Current Outpatient Medications   Medication    cetirizine HCl 10 MG CAPS    cholecalciferol (VITAMIN D) 200 units (5 mcg) TABS half-tab    diazepam (VALIUM) 5 MG tablet    FLUoxetine (PROZAC) 20 MG capsule    FLUoxetine (PROZAC) 40 MG capsule    melatonin 5 MG tablet    norethindrone (MICRONOR) 0.35 MG tablet     No current facility-administered medications for this visit.       ALLERGIES  Allergies   Allergen Reactions    Cats Itching    Dogs Itching    Grass Itching    Pineapple Itching        OBSTETRIC/GYNECOLOGIC HISTORY  Menarche: 15  Period cycle/length/flow/associated symptoms: currently amenorrheic  Current contraception: mini pill / progesterone only pill  No history of STDs  Lab Results   Component Value Date    PAP NIL 2020      History of abnormal Pap smear: No    OB History    Para Term  AB Living   0 0 0 0 0 0   SAB IAB Ectopic Multiple Live Births   0 0 0 0 0       PAST SURGICAL HISTORY   Past Surgical History:   Procedure Laterality Date    BIOPSY  Summer 2019    Vulva    ENT SURGERY      tooth procedure    EXAM UNDER ANESTHESIA PELVIC N/A 2020    Procedure: pap smear, pelvic exam under anesthesia;  Surgeon: Evie Ludwig MD;  Location: [facility] OR    HYMENOTOMY N/A 2020    Procedure: Hymenectomy;  Surgeon: Evie Ludwig MD;  Location: [facility] OR       SOCIAL HISTORY    Social History     Tobacco Use    Smoking status: Never    Smokeless tobacco: Never   Substance Use Topics    Alcohol use: Not Currently     Comment: I'll have a drink maybe once or twice a year    Drug use: Never       FAMILY HISTORY    Family History   Problem Relation Age of Onset    Other Cancer Maternal Grandfather         Some kind of abdominal cancer was suspected when he . He was a heavy smoker and alcoholic.    Substance Abuse Maternal Grandfather         alcohol    Anxiety Disorder Sister         social anxiety    Anxiety Disorder Sister         CED    Depression Sister     Depression Mother     Genetic Disorder Mother         Anais-Danlos syndrome (hyperflexibility)    Anxiety Disorder Mother     Breast Cancer No family hx of     Ovarian Cancer No family hx of     Colon Cancer No family hx of        OBJECTIVE  /72   Pulse 74   Ht 1.524 m (5')   Wt 44.9 kg (99 lb)   BMI 19.33 kg/m      General: Alert, without distress  HEENT: normocephalic, without obvious abnormality   Breast: Within normal limits bilaterally, no nipple discharge, no lymphadenopathy  Cardiovascular:  regular rate and rhythm   Abdomen: soft, non-tender, non-distended   Pelvic: normal external female genitalia; normal vagina without discharge; normal cervix without lesions/masses; normal anus/perineum   Extremities: normal    ASSESSMENT  Dina Hoover is a 28 year old G0 who is here for an annual examination.     PLAN  Problem   Encounter for Gynecological Examination      Age 19-39 Annual Preventive Exam  1.  Screening:   STD testing: declined today   Cervical cancer: last pap 2/2020 NIL; repeat done today 1/2024 NIL. Repeat due 2027.   Breast cancer: CBE today normal, repeat annually; mammograms to be started at age 40    2.  Immunizations   Last Tdap 2012; repeat given today 01/2024; repeat due 2034   HPV series completed        Vaginismus    Previous exam demonstrated pelvic floor hypertonicity so this was not repeated today. Gaviota is interested in PFPT at this time. Referral entered.     Anorgasmia of Female    Discussed that anorgasmia is often multifactorial, but that I would recommend starting with therapy as discussion of sex and sexual pleasure with her partner is something she struggles to put into words. We reviewed how this is very hard to talk about but really important to the experience of sexual pleasure. Referral entered.     Dysmenorrhea    Gaviota and I discussed the options for managing painful menses again, including IUD, Nexplanon, pill/patch/ring, shot, as it is possible the minipill is contributing to anorgasmia (though this has been present since before use of the pill). Of all the methods, given her previous experiences with contraception, I recommended the IUD as I think this will limit her side effects. Gaviota at this time prefers to continue with the minipill as it has been working well for her. Refill provided.         RTC in one year for an annual examination.     Evie Ludwig MD, MSCI, FACOG    [facility]/OBGYN  1/31/24

## 2024-01-31 NOTE — LETTER
1/31/2024       RE: Dina Hoover  [address] [facility] [place] [address]     Dear Colleague,    Thank you for referring your patient, Dina Hoover, to the [facility] [place] at [facility]. Please see a copy of my visit note below.    Chief Complaint   Patient presents with    Physical     Annual exam    Deidre Weston LPN     [facility]  Gynecology Visit    SUBJECTIVE    Dina Hoover is a 28 year old G0 who is here for an annual examination. She has the following concerns she wants to discuss today:    1) Inability to insert anything into the vagina: This continues to be painful. She did purchase a vaginal dilator but has not yet used it. She is interested in pelvic floor physical therapy but lives in [place] so would be most interested in a location closer to her home. She did have a doctor wonder if in the past LS was contributing, and did have a negative biopsy. She also wonders if vaginal botox would be beneficial. She did try the estrogen cream but this didn't seem to make a difference so she stopped using it. No bowel or bladder concerns.     2) Anorgasmia: She has tried vibrators but isn't able to achieve orgasm. Gaviota does note she has a stressful job. She did for a time stop the antidepressant medications but that didn't seem to help. She does feel it is difficult to talk about sex with her partner.     She continues to use the minipill and is happy to not have a period as previously these were quite painful. She wonders if the pill is contributing to the anorgasmia she is experiencing.     Her LMP is: No LMP recorded. (Menstrual status: Birth Control).    PAST MEDICAL HISTORY  Past Medical History:   Diagnosis Date    Back pain     Depression     Generalized anxiety disorder        MEDICATIONS  Current Outpatient Medications   Medication    cetirizine HCl 10 MG CAPS    cholecalciferol (VITAMIN D)  200 units (5 mcg) TABS half-tab    diazepam (VALIUM) 5 MG tablet    FLUoxetine (PROZAC) 20 MG capsule    FLUoxetine (PROZAC) 40 MG capsule    melatonin 5 MG tablet    norethindrone (MICRONOR) 0.35 MG tablet     No current facility-administered medications for this visit.       ALLERGIES  Allergies   Allergen Reactions    Cats Itching    Dogs Itching    Grass Itching    Pineapple Itching       OBSTETRIC/GYNECOLOGIC HISTORY  Menarche: 15  Period cycle/length/flow/associated symptoms: currently amenorrheic  Current contraception: mini pill / progesterone only pill  No history of STDs  Lab Results   Component Value Date    PAP NIL 2020      History of abnormal Pap smear: No    OB History    Para Term  AB Living   0 0 0 0 0 0   SAB IAB Ectopic Multiple Live Births   0 0 0 0 0       PAST SURGICAL HISTORY   Past Surgical History:   Procedure Laterality Date    BIOPSY  Summer 2019    Vulva    ENT SURGERY      tooth procedure    EXAM UNDER ANESTHESIA PELVIC N/A 2020    Procedure: pap smear, pelvic exam under anesthesia;  Surgeon: Evie Ludwig MD;  Location: [facility] OR    HYMENOTOMY N/A 2020    Procedure: Hymenectomy;  Surgeon: Evie Ludwig MD;  Location: [facility] OR       SOCIAL HISTORY    Social History     Tobacco Use    Smoking status: Never    Smokeless tobacco: Never   Substance Use Topics    Alcohol use: Not Currently     Comment: I'll have a drink maybe once or twice a year    Drug use: Never       FAMILY HISTORY    Family History   Problem Relation Age of Onset    Other Cancer Maternal Grandfather         Some kind of abdominal cancer was suspected when he . He was a heavy smoker and alcoholic.    Substance Abuse Maternal Grandfather         alcohol    Anxiety Disorder Sister         social anxiety    Anxiety Disorder Sister         CED    Depression Sister     Depression Mother     Genetic Disorder Mother         Anais-Danlos syndrome (hyperflexibility)    Anxiety Disorder Mother      Breast Cancer No family hx of     Ovarian Cancer No family hx of     Colon Cancer No family hx of        OBJECTIVE  /72   Pulse 74   Ht 1.524 m (5')   Wt 44.9 kg (99 lb)   BMI 19.33 kg/m      General: Alert, without distress  HEENT: normocephalic, without obvious abnormality   Breast: Within normal limits bilaterally, no nipple discharge, no lymphadenopathy  Cardiovascular: regular rate and rhythm   Abdomen: soft, non-tender, non-distended   Pelvic: normal external female genitalia; normal vagina without discharge; normal cervix without lesions/masses; normal anus/perineum   Extremities: normal    ASSESSMENT  Dina Hoover is a 28 year old G0 who is here for an annual examination.     PLAN  Problem   Encounter for Gynecological Examination      Age 19-39 Annual Preventive Exam  1.  Screening:   STD testing: declined today   Cervical cancer: last pap 2/2020 NIL; repeat done today 1/2024 NIL. Repeat due 2027.   Breast cancer: CBE today normal, repeat annually; mammograms to be started at age 40    2.  Immunizations   Last Tdap 2012; repeat given today 01/2024; repeat due 2034   HPV series completed        Vaginismus    Previous exam demonstrated pelvic floor hypertonicity so this was not repeated today. Gaviota is interested in PFPT at this time. Referral entered.     Anorgasmia of Female    Discussed that anorgasmia is often multifactorial, but that I would recommend starting with therapy as discussion of sex and sexual pleasure with her partner is something she struggles to put into words. We reviewed how this is very hard to talk about but really important to the experience of sexual pleasure. Referral entered.     Dysmenorrhea    Gaviota and I discussed the options for managing painful menses again, including IUD, Nexplanon, pill/patch/ring, shot, as it is possible the minipill is contributing to anorgasmia (though this has been present since before use of the pill). Of all the methods, given her  previous experiences with contraception, I recommended the IUD as I think this will limit her side effects. Gaviota at this time prefers to continue with the minipill as it has been working well for her. Refill provided.         RTC in one year for an annual examination.     Evie Ludwig MD, MSCI, FACOG    [facility]/OB[facility]N  1/31/24

## 2024-02-03 LAB
BKR LAB AP GYN ADEQUACY: NORMAL
BKR LAB AP GYN INTERPRETATION: NORMAL
BKR LAB AP HPV REFLEX: NORMAL
BKR LAB AP PREVIOUS ABNORMAL: NORMAL
PATH REPORT.COMMENTS IMP SPEC: NORMAL
PATH REPORT.COMMENTS IMP SPEC: NORMAL
PATH REPORT.RELEVANT HX SPEC: NORMAL

## 2024-02-05 PROBLEM — N94.6 DYSMENORRHEA: Status: ACTIVE | Noted: 2024-02-05

## 2024-02-05 PROBLEM — N94.2 VAGINISMUS: Status: ACTIVE | Noted: 2024-02-05

## 2024-02-05 PROBLEM — Z01.419 ENCOUNTER FOR GYNECOLOGICAL EXAMINATION: Status: ACTIVE | Noted: 2024-02-05

## 2024-02-05 PROBLEM — F52.31 ANORGASMIA OF FEMALE: Status: ACTIVE | Noted: 2024-02-05

## 2024-03-04 ENCOUNTER — TELEPHONE (OUTPATIENT)
Dept: OBGYN | Facility: CLINIC | Age: 29
End: 2024-03-04
Payer: COMMERCIAL

## 2024-03-04 DIAGNOSIS — B37.31 YEAST INFECTION OF THE VAGINA: Primary | ICD-10-CM

## 2024-03-04 RX ORDER — FLUCONAZOLE 150 MG/1
150 TABLET ORAL ONCE
Qty: 1 TABLET | Refills: 1 | Status: SHIPPED | OUTPATIENT
Start: 2024-03-04 | End: 2024-03-04

## 2024-03-04 NOTE — TELEPHONE ENCOUNTER
Called and spoke with the patient to go over the medication that she is needing before having her dental procedure and being on antibiotics.     Patient stated it is Diflucan and could [facility].     Medication sent with a refill.     All questions answered.

## 2024-03-04 NOTE — TELEPHONE ENCOUNTER
[facility] Call Center    Phone Message    May a detailed message be left on voicemail: yes     Reason for Call: Other: This pt is calling in regards to a wisdom teeth removal appt she has coming up. Pt is requesting an antibiotic that she has taken before to prevent yeast infection when taking a course of antibiotics. Please call pt back to discuss.      Action Taken: Other: OBGYN    Travel Screening: Not Applicable

## 2024-03-13 ENCOUNTER — THERAPY VISIT (OUTPATIENT)
Dept: PHYSICAL THERAPY | Facility: REHABILITATION | Age: 29
End: 2024-03-13
Attending: OBSTETRICS & GYNECOLOGY
Payer: COMMERCIAL

## 2024-03-13 DIAGNOSIS — N94.2 VAGINISMUS: ICD-10-CM

## 2024-03-13 DIAGNOSIS — F52.31 ANORGASMIA OF FEMALE: ICD-10-CM

## 2024-03-13 PROCEDURE — 97161 PT EVAL LOW COMPLEX 20 MIN: CPT | Mod: GP

## 2024-03-13 PROCEDURE — 97530 THERAPEUTIC ACTIVITIES: CPT | Mod: GP

## 2024-03-13 PROCEDURE — 97110 THERAPEUTIC EXERCISES: CPT | Mod: GP

## 2024-03-13 NOTE — PROGRESS NOTES
PHYSICAL THERAPY EVALUATION  Type of Visit: Evaluation    See electronic medical record for Abuse and Falls Screening details.    Subjective       Presenting condition or subjective complaint: Inability to insert anything in vagina without pain    Dyspareunia superficial and deep. Unable to tolerate penetrative intercourse and unable to achieve orgasm.  Feels she has tendency to tense her muscles- wonders if she has a muscle disorder that causes this, as her mother was diagnosed. Mother also has EDS- pt reports she is flexible but does feel she has this. Owns a pelvic wand but has yet to try.       Date of onset: 01/31/24 (order date 1/31/24, onset date years prior)    Relevant medical history: Depression   Past Medical History:   Diagnosis Date    Back pain     Depression     Generalized anxiety disorder      Dates & types of surgery: 2/2020 surgical removal of the hymen    Prior diagnostic imaging/testing results: Other Ultrasound   Prior therapy history for the same diagnosis, illness or injury: No      Prior Level of Function  Transfers:   Ambulation:   ADL:   IADL:     Living Environment  Social support: With a significant other or spouse   Type of home: Apartment/condo   Stairs to enter the home: Yes 4 Is there a railing: Yes   Ramp: No   Stairs inside the home: No       Help at home: None  Equipment owned:       Employment: Yes  at a [employer]  Hobbies/Interests: Sewing, watching movies, listening to audiobooks, thrift shopping    Patient goals for therapy: Have penetrative sex    Pain assessment: Pain present     Objective      PELVIC EVALUATION  ADDITIONAL HISTORY:  Sex assigned at birth: Female  Gender identity: Female    Pronouns: She/Her Hers      Bladder History:  Feels bladder filling: No  Triggers for feeling of inability to wait to go to the bathroom: No    How long can you wait to urinate: Over an hour  Gets up at night to urinate: No    Can stop the flow of urine when urinating:  Yes  Volume of urine usually released: Average   Other issues:    Number of bladder infections in last 12 months:    Fluid intake per day: 36 oz ? 12 oz 0  Medications taken for bladder: No     Activities causing urine leak:      Amount of urine typically leaked:    Pads used to help with leaking:          Bowel History:  Frequency of bowel movement: Daily  Consistency of stool: Soft-formed    Ignores the urge to defecate: No  Other bowel issues:    Length of time spent trying to have a bowel movement:      Sexual Function History:  Sexual orientation: Lesbian  partner is trans  Sexually active: Yes  Lubrication used: No No  Pelvic pain: Initial penetration (rectal or vaginal); Deep penetration (rectal or vaginal); Pelvic exams; Use of tampon    Pain or difficulty with orgasms/erection/ejaculation: Yes I have never orgasmed  State of menopause: Perimenopause (have not gone through menopause yet)  Hormone medications: No      Are you currently pregnant: No, Number of previous pregnancies: 0, Have you been diagnosed with pelvic prolapse or abdominal separation: No, Do you get regular exercise: Yes, I do this type of exercise: Walking, ab exercise, Have you tried pelvic floor strengthening exercises for 4 weeks: No, Do you have any history of trauma that is relevant to your care that you d like to share: No    Discussed reason for referral regarding pelvic health needs and external/internal pelvic floor muscle examination with patient/guardian.  Opportunity provided to ask questions and verbal consent for assessment and intervention was given.    PAIN:   POSTURE:   LUMBAR SCREEN:   (Degrees) Left AROM Left PROM  Right AROM Right PROM   Hip Flexion  WNL  WNL   Hip Extension       Hip Abduction       Hip Adduction       Hip Internal Rotation  WNL  WNL   Hip External Rotation  WNL  WNL   Knee Flexion       Knee Extension       Lumbar Side glide     Lumbar Flexion Palms to floor   Lumbar Extension WNL   Pain:   End feel:    HIP SCREEN:  Strength:    Functional Strength Testing: Double Leg Squat: Anterior knee translation, Knee valgus, Hip internal rotation, and Improper use of glutes/hips    PELVIC/SI SCREEN:     PAIN PROVOCATION TEST:   PELVIS/SI SPECIAL TESTS:   BREATHING SYMMETRY:  decreased abdominal mobility    Rib angle: less than 90 degrees     Palpation: increased resistance and pain throughout OI, piriformis, adductors and gluteals ruth    Joint mobility: lumbar and thoracic spine WNL with segmental mobility assessment    PELVIC EXAM  External Visual Inspection:      Integumentary:       External Digital Palpation per Perineum:       Scar:   Location/Type:   Mobility:     Internal Digital Palpation:  Per Vagina:      Per Rectum:        Pelvic Organ Prolapse:       ABDOMINAL ASSESSMENT  Diastasis Rectus Abdominis (CARMEN):  CARMEN presence: No    Abdominal Activation/Strength:  able to isolate    Scar:   Location/Type:   Mobility:     Fascial Tension/Restriction: Hypomobile, restricted throughout abdomen     BIOFEEDBACK:  Position:   Surface Electrodes:     Abdominals:     Perianals:       DERMATOMES:   DTR S:     Assessment & Plan   CLINICAL IMPRESSIONS  Medical Diagnosis: N94.2 (ICD-10-CM) - Vaginismus  F52.31 (ICD-10-CM) - Anorgasmia of female    Treatment Diagnosis: pelvic floor dysfunction   Impression/Assessment: Patient is a 28 year old female with dyspareunia and Anorgasmia complaints.  The following significant findings have been identified: Pain, Decreased ROM/flexibility, and Impaired muscle performance. These impairments interfere with their ability to perform self care tasks as compared to previous level of function.     Clinical Decision Making (Complexity):  Clinical Presentation: Stable/Uncomplicated  Clinical Presentation Rationale: based on medical and personal factors listed in PT evaluation  Clinical Decision Making (Complexity): Low complexity    PLAN OF CARE  Treatment Interventions:  Modalities:  Biofeedback  Interventions: Gait Training, Manual Therapy, Neuromuscular Re-education, Therapeutic Activity, Therapeutic Exercise, Self-Care/Home Management    Long Term Goals     PT Goal 1  Goal Identifier: HEP  Goal Description: pt will demonstrate independence and report compliance with HEP to faciliate improved independent management of condition.  Rationale: to maximize safety and independence with performance of ADLs and functional tasks  Target Date: 06/05/24  PT Goal 2  Goal Identifier: pelvic floor coordination  Goal Description: pt will demonstrate WNL PF lift, release and excursion.  Rationale: to maximize safety and independence with performance of ADLs and functional tasks  Target Date: 06/05/24  PT Goal 3  Goal Identifier: penetration  Goal Description: pt will report ability to tolerate penetrative intercourse without pain or tissue resistance .  Rationale: to maximize safety and independence with self cares  Target Date: 06/05/24      Frequency of Treatment: 1x/week  Duration of Treatment: up to 12 weeks    Recommended Referrals to Other Professionals:   Education Assessment:   Learner/Method: Patient    Risks and benefits of evaluation/treatment have been explained.   Patient/Family/caregiver agrees with Plan of Care.     Evaluation Time:     PT Eval, Low Complexity Minutes (21422): 30       Signing Clinician: Chelsi Henry PT

## 2024-03-27 ENCOUNTER — THERAPY VISIT (OUTPATIENT)
Dept: PHYSICAL THERAPY | Facility: REHABILITATION | Age: 29
End: 2024-03-27
Attending: OBSTETRICS & GYNECOLOGY
Payer: COMMERCIAL

## 2024-03-27 DIAGNOSIS — N94.2 VAGINISMUS: Primary | ICD-10-CM

## 2024-03-27 DIAGNOSIS — F52.31 ANORGASMIA OF FEMALE: ICD-10-CM

## 2024-03-27 PROCEDURE — 97750 PHYSICAL PERFORMANCE TEST: CPT | Mod: GP

## 2024-03-27 PROCEDURE — 97530 THERAPEUTIC ACTIVITIES: CPT | Mod: GP

## 2024-04-03 ENCOUNTER — THERAPY VISIT (OUTPATIENT)
Dept: PHYSICAL THERAPY | Facility: REHABILITATION | Age: 29
End: 2024-04-03
Payer: COMMERCIAL

## 2024-04-03 DIAGNOSIS — N94.2 VAGINISMUS: Primary | ICD-10-CM

## 2024-04-03 DIAGNOSIS — F52.31 ANORGASMIA OF FEMALE: ICD-10-CM

## 2024-04-03 PROCEDURE — 90912 BFB TRAINING 1ST 15 MIN: CPT | Mod: GP

## 2024-04-03 PROCEDURE — 97140 MANUAL THERAPY 1/> REGIONS: CPT | Mod: GP

## 2024-04-17 ENCOUNTER — THERAPY VISIT (OUTPATIENT)
Dept: PHYSICAL THERAPY | Facility: REHABILITATION | Age: 29
End: 2024-04-17
Payer: COMMERCIAL

## 2024-04-17 DIAGNOSIS — N94.2 VAGINISMUS: Primary | ICD-10-CM

## 2024-04-17 DIAGNOSIS — F52.31 ANORGASMIA OF FEMALE: ICD-10-CM

## 2024-04-17 PROCEDURE — 97140 MANUAL THERAPY 1/> REGIONS: CPT | Mod: GP

## 2024-04-17 PROCEDURE — 97530 THERAPEUTIC ACTIVITIES: CPT | Mod: GP

## 2024-05-03 ENCOUNTER — THERAPY VISIT (OUTPATIENT)
Dept: PHYSICAL THERAPY | Facility: REHABILITATION | Age: 29
End: 2024-05-03
Payer: COMMERCIAL

## 2024-05-03 DIAGNOSIS — N94.2 VAGINISMUS: Primary | ICD-10-CM

## 2024-05-03 DIAGNOSIS — F52.31 ANORGASMIA OF FEMALE: ICD-10-CM

## 2024-05-03 PROCEDURE — 97110 THERAPEUTIC EXERCISES: CPT | Mod: GP

## 2024-05-03 PROCEDURE — 97530 THERAPEUTIC ACTIVITIES: CPT | Mod: GP

## 2024-05-03 PROCEDURE — 97140 MANUAL THERAPY 1/> REGIONS: CPT | Mod: GP

## 2024-05-29 ENCOUNTER — THERAPY VISIT (OUTPATIENT)
Dept: PHYSICAL THERAPY | Facility: REHABILITATION | Age: 29
End: 2024-05-29
Payer: COMMERCIAL

## 2024-05-29 DIAGNOSIS — F52.31 ANORGASMIA OF FEMALE: ICD-10-CM

## 2024-05-29 DIAGNOSIS — N94.2 VAGINISMUS: Primary | ICD-10-CM

## 2024-05-29 PROCEDURE — 97140 MANUAL THERAPY 1/> REGIONS: CPT | Mod: GP

## 2024-06-21 ENCOUNTER — THERAPY VISIT (OUTPATIENT)
Dept: PHYSICAL THERAPY | Facility: REHABILITATION | Age: 29
End: 2024-06-21
Payer: COMMERCIAL

## 2024-06-21 DIAGNOSIS — N94.2 VAGINISMUS: Primary | ICD-10-CM

## 2024-06-21 DIAGNOSIS — F52.31 ANORGASMIA OF FEMALE: ICD-10-CM

## 2024-06-21 PROCEDURE — 97140 MANUAL THERAPY 1/> REGIONS: CPT | Mod: GP

## 2024-06-21 NOTE — PROGRESS NOTES
PLAN  Continue therapy per current plan of care.  Extend POC by 12 weeks as pt is progressing but at a slower rate than expected.     Beginning/End Dates of Progress Note Reporting Period:  04/24/24 to 06/21/2024    Referring Provider:  Evie Ludwig         06/21/24 0500   Appointment Info   Signing clinician's name / credentials Chelsi Henry, PT, DPT   Total/Authorized Visits up to 12 visits   Visits Used 7   Medical Diagnosis N94.2 (ICD-10-CM) - Vaginismus  F52.31 (ICD-10-CM) - Anorgasmia of female   PT Tx Diagnosis pelvic floor dysfunction   Other pertinent information Discussed reason for referral regarding pelvic health needs and external/internal pelvic floor muscle examination with patient/guardian.  Opportunity provided to ask questions and verbal consent for assessment and intervention was given.   Quick Adds Pelvic Consent   Progress Note/Certification   Onset of illness/injury or Date of Surgery 01/31/24  (order date 1/31/24, onset date years prior)   Therapy Frequency 1x/week   Predicted Duration up to 12 weeks; extend POC by 12 weeks   Progress Note Due Date 06/05/24   Progress Note Completed Date 04/24/24   GOALS   PT Goals 2;3;4   PT Goal 1   Goal Identifier HEP   Goal Description pt will demonstrate independence and report compliance with HEP to faciliate improved independent management of condition.   Rationale to maximize safety and independence with performance of ADLs and functional tasks   Goal Progress in progress   Target Date 08/28/24   PT Goal 2   Goal Identifier pelvic floor coordination   Goal Description pt will demonstrate WNL PF lift, release and excursion.   Rationale to maximize safety and independence with performance of ADLs and functional tasks   Goal Progress in progress   Target Date 08/28/24   PT Goal 3   Goal Identifier penetration   Goal Description pt will report ability to tolerate penetrative intercourse without pain or tissue resistance .   Rationale to maximize  safety and independence with self cares   Goal Progress in progress, size 4 dilator   Target Date 08/28/24   Subjective Report   Subjective Report name prounced: Stay-sha. Able to easily use size 4. size 5 is still a little uncomfortable. Tried some self release techniques and was effective only once.  HEP lately. Jaw is feeling better but has 2 day onset of medial scapular pain after performing bicycle crunches.   Objective Measures   Objective Measures Objective Measure 1;Objective Measure 2;Objective Measure 3;Objective Measure 4   Objective Measure 1   Objective Measure palpation-pelvic floor   Details ongoing tenderness along layer 1 PFM without much tissue resistance   Objective Measure 2   Objective Measure palpation-mid back/scapula   Details tender point at right rhomboid and thoracic paraspinals   Treatment Interventions (PT)   Interventions Manual Therapy;Self Care/Home Management   Therapeutic Procedure/Exercise   Therapeutic Procedures: strength, endurance, ROM, flexibility minutes (20857) 4   PTRx Ther Proc 1 cat/cow   PTRx Ther Proc 1 - Details HEP   PTRx Ther Proc 2 Happy Baby   PTRx Ther Proc 2 - Details HEP   PTRx Ther Proc 3 Pretzel Stretch   PTRx Ther Proc 3 - Details HEP   PTRx Ther Proc 4 neck stretches for jaw/neck pain   PTRx Ther Proc 4 - Details HEP   Skilled Intervention HEP for pelvic mobility   Ther Proc 1 bow and arrow   Ther Proc 1 - Details for mid back pain- VR   Therapeutic Activity   Therapeutic Activities Ther Act 2;Ther Act 3   Ther Act 1 Pelvic Floor Quieting-submax kegel followed by relax   Ther Act 1 - Details HEP   Ther Act 2 dilator use   Ther Act 2 - Details HEP   Ther Act 3 tissue desensitization   Ther Act 3 - Details HE   PTRx Ther Act 1 overactive dysfunction   PTRx Ther Act 1 - Details HEP   Skilled Intervention pelvic floor quieting, self-treatment techniques for PFM overactivity   Manual Therapy   Manual Therapy: Mobilization, MFR, MLD, friction massage minutes  (99970) 47   Manual Therapy Manual Therapy 2;Manual Therapy 3;Manual Therapy 4   Manual Therapy 1 trigger point release- pelvic floor   Manual Therapy 1 - Details bulbo, ischio, LA and superficial transverse perineal ruth   Manual Therapy 2 MFR   Manual Therapy 2 - Details thoracic spine and right scapular region   Skilled Intervention manual to reduce tissue tension and pain   Patient Response/Progress discomfort where tissue tension noted, but reducing to pressure sensation   Manual Therapy 3 joint mobilization   Manual Therapy 3 - Details Grade III throughout thoracic spine and right rib cage with pt in prone   Manual Therapy 4 STM and trigger point release-back/scapula   Manual Therapy 4 - Details along rhomboids and paraspinals   Self Care/home Management   Self Care Self Care 2;Self Care 3   Self Care 1 self treatment technique   Self Care 1 - Details continue for external tissues including bulbo, ischio, perineal body and superficial transverse perineal   Self Care 2 HEP   Self Care 2 - Details return to performing stretches and breathing more regularly to help with PFM relaxation.   Skilled Intervention education for self treatment techniques and encourage ongoing HEP performance to improve autonomy of care   Education   Learner/Method Patient   Plan   Home program ptrx   Updates to plan of care extend POC by 12 weeks as pt is progressing but at a slower rate than expected   Comments   Comments Pt has attended 7 visits for pelvic pain. Pt has demonstrated good progress towards goals, tolerating size 4 and 5 dilators. Pt continues to have difficulty with PFM relaxation, and would benefit from additional PT.   Pelvic Health Informed Consent Statement Discussed with patient/guardian reason for referral regarding pelvic health needs and external/internal pelvic floor muscle examination.  Opportunity provided to ask questions and verbal consent for assessment and intervention was given.   Total Session Time    Timed Code Treatment Minutes 39   Total Treatment Time (sum of timed and untimed services) 39

## 2024-07-11 ENCOUNTER — THERAPY VISIT (OUTPATIENT)
Dept: PHYSICAL THERAPY | Facility: REHABILITATION | Age: 29
End: 2024-07-11
Payer: COMMERCIAL

## 2024-07-11 DIAGNOSIS — N94.2 VAGINISMUS: Primary | ICD-10-CM

## 2024-07-11 DIAGNOSIS — F52.31 ANORGASMIA OF FEMALE: ICD-10-CM

## 2024-07-11 PROCEDURE — 97535 SELF CARE MNGMENT TRAINING: CPT | Mod: GP

## 2024-07-11 PROCEDURE — 97140 MANUAL THERAPY 1/> REGIONS: CPT | Mod: GP

## 2024-07-14 ENCOUNTER — HEALTH MAINTENANCE LETTER (OUTPATIENT)
Age: 29
End: 2024-07-14

## 2024-08-21 ENCOUNTER — THERAPY VISIT (OUTPATIENT)
Dept: PHYSICAL THERAPY | Facility: REHABILITATION | Age: 29
End: 2024-08-21
Payer: COMMERCIAL

## 2024-08-21 DIAGNOSIS — N94.2 VAGINISMUS: Primary | ICD-10-CM

## 2024-08-21 DIAGNOSIS — F52.31 ANORGASMIA OF FEMALE: ICD-10-CM

## 2024-08-21 PROCEDURE — 97140 MANUAL THERAPY 1/> REGIONS: CPT | Mod: GP

## 2024-09-20 ENCOUNTER — THERAPY VISIT (OUTPATIENT)
Dept: PHYSICAL THERAPY | Facility: REHABILITATION | Age: 29
End: 2024-09-20
Payer: COMMERCIAL

## 2024-09-20 DIAGNOSIS — N94.2 VAGINISMUS: Primary | ICD-10-CM

## 2024-09-20 DIAGNOSIS — F52.31 ANORGASMIA OF FEMALE: ICD-10-CM

## 2024-09-20 PROCEDURE — 97140 MANUAL THERAPY 1/> REGIONS: CPT | Mod: GP

## 2024-09-20 NOTE — PROGRESS NOTES
PLAN  Continue therapy per current plan of care.  extend POC by 12 weeks with frequency of 1x/month     Beginning/End Dates of Progress Note Reporting Period:  6/21/24 to 09/20/2024    Referring Provider:  Evie Ludwig       09/20/24 0500   Appointment Info   Signing clinician's name / credentials Chelsi Henry, PT, DPT   Total/Authorized Visits up to 12 visits   Visits Used 10   Medical Diagnosis N94.2 (ICD-10-CM) - Vaginismus  F52.31 (ICD-10-CM) - Anorgasmia of female   PT Tx Diagnosis pelvic floor dysfunction   Other pertinent information Discussed reason for referral regarding pelvic health needs and external/internal pelvic floor muscle examination with patient/guardian.  Opportunity provided to ask questions and verbal consent for assessment and intervention was given.   Progress Note/Certification   Onset of illness/injury or Date of Surgery 01/31/24  (order date 1/31/24, onset date years prior)   Therapy Frequency 1x/month   Predicted Duration extend POC by 12 weeks   Progress Note Due Date 11/20/24   Progress Note Completed Date 09/20/24   GOALS   PT Goals 2;3;4   PT Goal 1   Goal Identifier HEP   Goal Description pt will demonstrate independence and report compliance with HEP to faciliate improved independent management of condition.   Rationale to maximize safety and independence with performance of ADLs and functional tasks   Goal Progress in progress   Target Date 08/28/24   PT Goal 2   Goal Identifier pelvic floor coordination   Goal Description pt will demonstrate WNL PF lift, release and excursion.   Rationale to maximize safety and independence with performance of ADLs and functional tasks   Goal Progress met   Target Date 08/28/24   Date Met 09/20/24   PT Goal 3   Goal Identifier penetration   Goal Description pt will report ability to tolerate penetrative intercourse without pain or tissue resistance .   Rationale to maximize safety and independence with self cares   Goal Progress on size 5  dilator, some pain   Target Date 08/28/24   Subjective Report   Subjective Report name prounced: Stay-sha.  Hasn't been using dilator as much but still able to tolerate size 5. tried some tips and techniques to promote improve clitoral stimulation without improvement. See OBGYN at end of next month and wants to discuss further.   Objective Measures   Objective Measures Objective Measure 1;Objective Measure 2;Objective Measure 3;Objective Measure 4   Treatment Interventions (PT)   Interventions Manual Therapy   Therapeutic Procedure/Exercise   Ther Proc 1 bow and arrow   Ther Proc 1 - Details for mid back pain- VR   PTRx Ther Proc 1 cat/cow   PTRx Ther Proc 1 - Details HEP   PTRx Ther Proc 2 Happy Baby   PTRx Ther Proc 2 - Details HEP   PTRx Ther Proc 3 Pretzel Stretch   PTRx Ther Proc 3 - Details HEP   PTRx Ther Proc 4 neck stretches for jaw/neck pain   PTRx Ther Proc 4 - Details HEP   Skilled Intervention HEP for pelvic mobility   Therapeutic Activity   Therapeutic Activities Ther Act 2;Ther Act 3   Ther Act 1 Pelvic Floor Quieting-submax kegel followed by relax   Ther Act 1 - Details HEP   Ther Act 2 dilator use   Ther Act 2 - Details HEP   Ther Act 3 tissue desensitization   Ther Act 3 - Details HE   PTRx Ther Act 1 overactive dysfunction   PTRx Ther Act 1 - Details HEP   Skilled Intervention pelvic floor quieting, self-treatment techniques for PFM overactivity   Manual Therapy   Manual Therapy: Mobilization, MFR, MLD, friction massage minutes ([id number]) 33   Manual Therapy Manual Therapy 2;Manual Therapy 3;Manual Therapy 4   Manual Therapy 1 clitoral renteria mobilization   Manual Therapy 1 - Details pt can continue with self-treatment technique   Manual Therapy 2 PFM layer 1 mobilization   Manual Therapy 2 - Details pill rolling and MFR into resistance ruth   Manual Therapy 3 PFM tender point release   Manual Therapy 3 - Details bulbo and ischio ruth, LA and OI ruth   Manual Therapy 4 MRF introitus   Manual Therapy 4 -  Details holding  seconds at 5 and 7   Skilled Intervention manual to reduce tissue tension and pain   Patient Response/Progress discomfort where tissue tension noted but notes more tolerable as compared to prior sessions   Self Care/home Management   Self Care Self Care 2;Self Care 3   Self Care 1 - Details difficulty with orgasm   Self Care 2 discussed PFM role in orgasm, and benefit of continued practice of PFM relaxation. Discussed potentially looking into seeing a sex therapist as pt has considered in the past.   Skilled Intervention recommendation of resources outside of PFPT to address difficulty with orgasm   Education   Learner/Method Patient   Plan   Home program ptrx   Updates to plan of care extend POC by 12 weeks with frequency of 1x/month   Plan for next session perform progress note   Comments   Comments Pt has attended 10 visit physical therapy visits for dyspareunia and anorgasmia. Pt has demonstrated good progress toward goals, progressing well with dilator set and note improved tolerance to vaginal penetration. pt continues to have difficulty with arousal/clitoral stimulation. Pt will be following up with OBGYN before next visit to discuss and assess further. Pt is appropriate to continue skilled physical therapy but with decreased frequency.   Pelvic Health Informed Consent Statement Discussed with patient/guardian reason for referral regarding pelvic health needs and external/internal pelvic floor muscle examination.  Opportunity provided to ask questions and verbal consent for assessment and intervention was given.   Total Session Time   Timed Code Treatment Minutes 33   Total Treatment Time (sum of timed and untimed services) 33

## 2024-10-30 ENCOUNTER — OFFICE VISIT (OUTPATIENT)
Dept: OBGYN | Facility: CLINIC | Age: 29
End: 2024-10-30
Attending: OBSTETRICS & GYNECOLOGY
Payer: COMMERCIAL

## 2024-10-30 VITALS
HEART RATE: 89 BPM | BODY MASS INDEX: 19.67 KG/M2 | HEIGHT: 60 IN | DIASTOLIC BLOOD PRESSURE: 63 MMHG | SYSTOLIC BLOOD PRESSURE: 90 MMHG | WEIGHT: 100.2 LBS

## 2024-10-30 DIAGNOSIS — N94.2 VAGINISMUS: Primary | ICD-10-CM

## 2024-10-30 DIAGNOSIS — N94.6 DYSMENORRHEA: ICD-10-CM

## 2024-10-30 PROCEDURE — 99213 OFFICE O/P EST LOW 20 MIN: CPT | Performed by: OBSTETRICS & GYNECOLOGY

## 2024-10-30 PROCEDURE — 99213 OFFICE O/P EST LOW 20 MIN: CPT | Mod: GC | Performed by: OBSTETRICS & GYNECOLOGY

## 2024-10-30 RX ORDER — ACETAMINOPHEN AND CODEINE PHOSPHATE 120; 12 MG/5ML; MG/5ML
0.35 SOLUTION ORAL DAILY
Qty: 84 TABLET | Refills: 3 | Status: SHIPPED | OUTPATIENT
Start: 2024-10-30

## 2024-10-30 RX ORDER — ESTRADIOL 0.1 MG/G
2 CREAM VAGINAL
Qty: 42.5 G | Refills: 3 | Status: SHIPPED | OUTPATIENT
Start: 2024-10-31

## 2024-10-30 NOTE — PATIENT INSTRUCTIONS
Thank you for trusting us with your care!   Please be aware, if you are on Mychart, you may see your results prior to your providers review. If labs are abnormal, we will call or message you on [facility] with a follow up plan.    If you need to contact us for questions about:  Symptoms, Scheduling & Medical Questions; Non-urgent (2-3 day response) [facility] REVA message, Urgent (needing response today) [phone] (if after 3:30pm next day response)   Prescriptions: Please call your Pharmacy   Billing: Suzette [phone] or [facility]:[phone]

## 2024-10-30 NOTE — LETTER
10/30/2024       RE: Dina Hoover  [address]  [place] [address]     Dear Colleague,    Thank you for referring your patient, Dina Hoover, to the [facility] [place] [facility]. Please see a copy of my visit note below.    [facility]  Gynecology Visit    HPI:    Dina Hoover is a 28 year old  here for follow up regarding vaginismus and anorgasmia. She has been going to pelvic floor PT regularly and feels significant improvement. States she has been able to work her way up to the largest vaginal dilator. She is still experiencing pain with insertion. She also reports anorgasmia and is wondering about clitoral atrophy or adhesions that could be contributing.      GYN History  - LMP: No LMP recorded. (Menstrual status: Birth Control).  - Menses: Currently amenorrheic   - Pap Smears: 2024 NILM  - Contraception: POPs     OBHx  OB History    Para Term  AB Living   0 0 0 0 0 0   SAB IAB Ectopic Multiple Live Births   0 0 0 0 0     Past Medical History:   Diagnosis Date     Back pain      Depression      Generalized anxiety disorder      Past Surgical History:   Procedure Laterality Date     BIOPSY  Summer 2019    Vulva     ENT SURGERY      tooth procedure     EXAM UNDER ANESTHESIA PELVIC N/A 2020    Procedure: pap smear, pelvic exam under anesthesia;  Surgeon: Evie Ludwig MD;  [facility]     HYMENOTOMY N/A 2020    Procedure: Hymenectomy;  Surgeon: Evie Ludwig MD;  [facility]       Current Outpatient Medications:      cetirizine HCl 10 MG CAPS, , Disp: , Rfl:      cholecalciferol (VITAMIN D) 200 units (5 mcg) TABS half-tab, , Disp: , Rfl:      [START ON 10/31/2024] estradiol (ESTRACE) 0.1 MG/GM vaginal cream, Place 2 g vaginally twice a week. Every night for 1 week, then twice weekly at night afterward., Disp: 42.5 g, Rfl: 3     FLUoxetine (PROZAC) 20 MG  capsule, Take 20 mg by mouth daily, Disp: , Rfl:      FLUoxetine (PROZAC) 40 MG capsule, Take 40 mg by mouth daily, Disp: , Rfl:      melatonin 5 MG tablet, Take 5 mg by mouth nightly as needed , Disp: , Rfl:      norethindrone (MICRONOR) 0.35 MG tablet, Take 1 tablet (0.35 mg) by mouth daily., Disp: 84 tablet, Rfl: 3     diazepam (VALIUM) 5 MG tablet, Take 1 tablet (5 mg) by mouth once as needed for anxiety (before procedure) (Patient not taking: Reported on 10/30/2024), Disp: 1 tablet, Rfl: 0  Allergies   Allergen Reactions     Cats Itching     Dogs Itching     Grass Itching     Pineapple Itching     Family History   Problem Relation Age of Onset     Other Cancer Maternal Grandfather         Some kind of abdominal cancer was suspected when he . He was a heavy smoker and alcoholic.     Substance Abuse Maternal Grandfather         alcohol     Anxiety Disorder Sister         social anxiety     Anxiety Disorder Sister         CED     Depression Sister      Depression Mother      Genetic Disorder Mother         Anais-Danlos syndrome (hyperflexibility)     Anxiety Disorder Mother      Breast Cancer No family hx of      Ovarian Cancer No family hx of      Colon Cancer No family hx of      Social History     Tobacco Use     Smoking status: Never     Smokeless tobacco: Never   Substance Use Topics     Alcohol use: Not Currently     Comment: I'll have a drink maybe once or twice a year     Drug use: Never     ROS: 10-Point ROS negative except as noted in HPI    Physical Exam  BP 90/63   Pulse 89   Ht 1.524 m (5')   Wt 45.5 kg (100 lb 3.2 oz)   BMI 19.57 kg/m    Gen: well-appearing, NAD  HEENT: normocephalic, atraumatic  CV: warm, well perfused  Pulm: normal work of breathing and respiratory rate  Abd: soft, non-tender, non-distended  Ext: no LE edema  Pelvic: Normal appearing external female genitalia. Normal hair distribution. Vagina is without lesions, slightly atrophic mucosa but overall healthy appearing  tissue. Scant white discharge present. Minimal tenderness to palpation of bilateral vestibules. Clitoral renteria slightly difficult to retract, no significant adhesions.     Assessment/Plan:  Dina Hoover is a 28 year old  here for follow up regarding vaginismus and anorgasmia.     # Vaginismus   Patient had hymenectomy in 2020.  Previous exam demonstrated pelvic floor hypertonicity so this was not repeated today.  She has been consistently going to pelvic floor physical therapy, which she believes has been helping symptoms.  Still experiencing pain with insertion of dilators.  Recommended using vaginal estrogen daily for 1 week then transitioning to 2 times per week to help with symptoms.  - Estrace cream 1x daily for 1 week, then 2x weekly   - Continue PFPT    # Anorgasmia   Previously discussed that anorgasmia is often multifactorial, was given a referral for therapy during last visit.  Encouraged to continue discussing sex and sexual pleasure with her partner.  Exam today revealed vaginal atrophy (also noted on previous exams), likely secondary to long-term OCP use.  Clitoral renteria slightly difficult to retract, although no significant adhesions noted.  No significant pain with palpation of the bilateral vestibules, briefly discussed vestibulectomy, although do not think this would help patient's symptoms.  Planning for vaginal estrogen use, which may improve symptoms.  - Estrace cream as described above     # Dysmenorrhea   Refills provided for Micronor.     Staffed with Dr. Ludwig.     Nicolette Veliz MD  Obstetrics & Gynecology, PGY-1  10/30/2024 5:24 PM    Attestation signed by Evie Ludwig MD at 11/3/2024  7:17 PM:      Physician Attestation  I personally examined and evaluated this patient.  I discussed the patient with the resident/fellow and care team, and agree with the assessment and plan of care as documented in the note.     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 Has made progress with PFPT. Will now trial vaginal estrogen cream and return in 3 months to see if symptoms have improved. Could consider a trial of intrarosa. Sex therapy may also be of benefit.    Evie Ludwig MD  Date of Service (when I saw the patient): 10/30/24      Again, thank you for allowing me to participate in the care of your patient.      Sincerely,    Evie Ludwig MD

## 2024-10-30 NOTE — PROGRESS NOTES
[facility]  Gynecology Visit    HPI:    Dina Hoover is a 28 year old  here for follow up regarding vaginismus and anorgasmia. She has been going to pelvic floor PT regularly and feels significant improvement. States she has been able to work her way up to the largest vaginal dilator. She is still experiencing pain with insertion. She also reports anorgasmia and is wondering about clitoral atrophy or adhesions that could be contributing.      GYN History  - LMP: No LMP recorded. (Menstrual status: Birth Control).  - Menses: Currently amenorrheic   - Pap Smears: 2024 NILM  - Contraception: POPs     OBHx  OB History    Para Term  AB Living   0 0 0 0 0 0   SAB IAB Ectopic Multiple Live Births   0 0 0 0 0     Past Medical History:   Diagnosis Date    Back pain     Depression     Generalized anxiety disorder      Past Surgical History:   Procedure Laterality Date    BIOPSY  Summer 2019    Vulva    ENT SURGERY      tooth procedure    EXAM UNDER ANESTHESIA PELVIC N/A 2020    Procedure: pap smear, pelvic exam under anesthesia;  Surgeon: Evie Ludwig MD;  [facility]    HYMENOTOMY N/A 2020    Procedure: Hymenectomy;  Surgeon: Evie Ludwig MD;  [facility]       Current Outpatient Medications:     cetirizine HCl 10 MG CAPS, , Disp: , Rfl:     cholecalciferol (VITAMIN D) 200 units (5 mcg) TABS half-tab, , Disp: , Rfl:     [START ON 10/31/2024] estradiol (ESTRACE) 0.1 MG/GM vaginal cream, Place 2 g vaginally twice a week. Every night for 1 week, then twice weekly at night afterward., Disp: 42.5 g, Rfl: 3    FLUoxetine (PROZAC) 20 MG capsule, Take 20 mg by mouth daily, Disp: , Rfl:     FLUoxetine (PROZAC) 40 MG capsule, Take 40 mg by mouth daily, Disp: , Rfl:     melatonin 5 MG tablet, Take 5 mg by mouth nightly as needed , Disp: , Rfl:     norethindrone (MICRONOR) 0.35 MG tablet, Take 1 tablet (0.35 mg) by mouth daily., Disp: 84 tablet, Rfl: 3    diazepam  (VALIUM) 5 MG tablet, Take 1 tablet (5 mg) by mouth once as needed for anxiety (before procedure) (Patient not taking: Reported on 10/30/2024), Disp: 1 tablet, Rfl: 0  Allergies   Allergen Reactions    Cats Itching    Dogs Itching    Grass Itching    Pineapple Itching     Family History   Problem Relation Age of Onset    Other Cancer Maternal Grandfather         Some kind of abdominal cancer was suspected when he . He was a heavy smoker and alcoholic.    Substance Abuse Maternal Grandfather         alcohol    Anxiety Disorder Sister         social anxiety    Anxiety Disorder Sister         CED    Depression Sister     Depression Mother     Genetic Disorder Mother         Anais-Danlos syndrome (hyperflexibility)    Anxiety Disorder Mother     Breast Cancer No family hx of     Ovarian Cancer No family hx of     Colon Cancer No family hx of      Social History     Tobacco Use    Smoking status: Never    Smokeless tobacco: Never   Substance Use Topics    Alcohol use: Not Currently     Comment: I'll have a drink maybe once or twice a year    Drug use: Never     ROS: 10-Point ROS negative except as noted in HPI    Physical Exam  BP 90/63   Pulse 89   Ht 1.524 m (5')   Wt 45.5 kg (100 lb 3.2 oz)   BMI 19.57 kg/m    Gen: well-appearing, NAD  HEENT: normocephalic, atraumatic  CV: warm, well perfused  Pulm: normal work of breathing and respiratory rate  Abd: soft, non-tender, non-distended  Ext: no LE edema  Pelvic: Normal appearing external female genitalia. Normal hair distribution. Vagina is without lesions, slightly atrophic mucosa but overall healthy appearing tissue. Scant white discharge present. Minimal tenderness to palpation of bilateral vestibules. Clitoral renteria slightly difficult to retract, no significant adhesions.     Assessment/Plan:  Dina Hoover is a 28 year old  here for follow up regarding vaginismus and anorgasmia.     # Vaginismus   Patient had hymenectomy in 2020.  Previous exam  demonstrated pelvic floor hypertonicity so this was not repeated today.  She has been consistently going to pelvic floor physical therapy, which she believes has been helping symptoms.  Still experiencing pain with insertion of dilators.  Recommended using vaginal estrogen daily for 1 week then transitioning to 2 times per week to help with symptoms.  - Estrace cream 1x daily for 1 week, then 2x weekly   - Continue PFPT    # Anorgasmia   Previously discussed that anorgasmia is often multifactorial, was given a referral for therapy during last visit.  Encouraged to continue discussing sex and sexual pleasure with her partner.  Exam today revealed vaginal atrophy (also noted on previous exams), likely secondary to long-term OCP use.  Clitoral renteria slightly difficult to retract, although no significant adhesions noted.  No significant pain with palpation of the bilateral vestibules, briefly discussed vestibulectomy, although do not think this would help patient's symptoms.  Planning for vaginal estrogen use, which may improve symptoms.  - Estrace cream as described above     # Dysmenorrhea   Refills provided for Micronor.     Staffed with Dr. Ludwig.     Nicolette Veliz MD  Obstetrics & Gynecology, PGY-1  10/30/2024 5:24 PM

## 2024-10-31 ASSESSMENT — ANXIETY QUESTIONNAIRES: GAD7 TOTAL SCORE: 7

## 2024-11-20 PROBLEM — N94.2 VAGINISMUS: Status: RESOLVED | Noted: 2024-02-05 | Resolved: 2024-11-20

## 2024-11-20 PROBLEM — F52.31 ANORGASMIA OF FEMALE: Status: RESOLVED | Noted: 2024-02-05 | Resolved: 2024-11-20

## 2025-01-08 DIAGNOSIS — N94.6 DYSMENORRHEA: ICD-10-CM

## 2025-01-09 ENCOUNTER — MYC MEDICAL ADVICE (OUTPATIENT)
Dept: OBGYN | Facility: CLINIC | Age: 30
End: 2025-01-09
Payer: COMMERCIAL

## 2025-01-09 DIAGNOSIS — N94.6 DYSMENORRHEA: ICD-10-CM

## 2025-01-09 RX ORDER — NORETHINDRONE 0.35 MG/1
0.35 TABLET ORAL DAILY
Qty: 84 TABLET | Refills: 3 | OUTPATIENT
Start: 2025-01-09

## 2025-01-09 NOTE — TELEPHONE ENCOUNTER
Refill request received, This RN called pharmacy, she still has refills available, will let her know she can contact pharmacy.

## 2025-01-14 RX ORDER — ACETAMINOPHEN AND CODEINE PHOSPHATE 120; 12 MG/5ML; MG/5ML
0.35 SOLUTION ORAL DAILY
Qty: 84 TABLET | Refills: 3 | Status: SHIPPED | OUTPATIENT
Start: 2025-01-14

## 2025-02-10 ENCOUNTER — TELEPHONE (OUTPATIENT)
Dept: OBGYN | Facility: CLINIC | Age: 30
End: 2025-02-10
Payer: COMMERCIAL

## 2025-02-10 NOTE — CONFIDENTIAL NOTE
[facility]    Phone Message    May a detailed message be left on voicemail: yes     Reason for Call: Medication Question or concern regarding medication   Prescription Clarification  Name of Medication: Prozac - 60mg 20 capsule and 40 capsule   Prescribing Provider: Dr Ludwig    Pharmacy:   [facility] - [place], [place] - [address] [place] [address]      What on the order needs clarification? Pt calling in to inform Dr Ludwig was aware she was taking Prozac, she nolonger has access to the provider she was getting it from before and would like to know if Dr Ludwig would be willing to prescribe this until she can get established with a new provider.       Action Taken: Other: [facility]     Travel Screening: Not Applicable     Date of Service:

## 2025-02-10 NOTE — TELEPHONE ENCOUNTER
[facility]     Phone Message     May a detailed message be left on voicemail: yes      Reason for Call: Medication Question or concern regarding medication   Prescription Clarification  Name of Medication: Prozac - 60mg 20 capsule and 40 capsule   Prescribing Provider: Dr Ludwig               Pharmacy:   [facility] - [place], [place] - [address] [place] [address]                  What on the order needs clarification? Pt calling in to inform Dr Ludwig was aware she was taking Prozac, she nolonger has access to the provider she was getting it from before and would like to know if Dr Ludwig would be willing to prescribe this until she can get established with a new provider.         Action Taken: Other: [facility]      Travel Screening: Not Applicable          Date of Service:

## 2025-02-28 PROBLEM — F32.1 MODERATE MAJOR DEPRESSION (H): Status: ACTIVE | Noted: 2025-02-28

## 2025-02-28 PROBLEM — N94.10 PAIN IN FEMALE GENITALIA ON INTERCOURSE: Status: ACTIVE | Noted: 2025-02-28

## 2025-04-06 ENCOUNTER — MYC MEDICAL ADVICE (OUTPATIENT)
Dept: FAMILY MEDICINE | Facility: CLINIC | Age: 30
End: 2025-04-06
Payer: COMMERCIAL

## 2025-04-07 DIAGNOSIS — B36.0 TINEA VERSICOLOR: Primary | ICD-10-CM

## 2025-04-07 NOTE — TELEPHONE ENCOUNTER
This RN called and spoke to Gaviota. She reports that she gets tinea versicolor when she is on antibiotics. She had fluconazole filled by Dr. Saleh on 3/4/24 when she developed this on her arm. It is coming back again and she is wondering if Dr. Saleh would send fluconazole in for her again for this reason.    Gaviota last seen at [facility] clinic on 10/30/24 with Dr. Ludwig.     This RN pended medication to Dr. Ludwig for review.

## 2025-04-07 NOTE — TELEPHONE ENCOUNTER
Responded to the patient through MyChart.     Steph Agudelo RN  [facility]

## 2025-04-07 NOTE — TELEPHONE ENCOUNTER
[facility] Call [facility]    Phone Message    May a detailed message be left on voicemail: yes     Reason for Call: Medication Question or concern regarding medication   Prescription Clarification  Name of Medication: For yeast infections  Prescribing Provider: Per pt Evie Ludwig MD   Pharmacy: [facility]    What on the order needs clarification?     Pt is requesting a refill for her yeast infection medication. Please review and call back to advise at # [phone].      Action Taken: Message routed to:  Other: [facility]     Travel Screening: Not Applicable     Date of Service:

## 2025-04-08 RX ORDER — FLUCONAZOLE 150 MG/1
150 TABLET ORAL ONCE
Qty: 1 TABLET | Refills: 0 | Status: SHIPPED | OUTPATIENT
Start: 2025-04-08 | End: 2025-04-08

## 2025-05-12 ENCOUNTER — TELEPHONE (OUTPATIENT)
Dept: PSYCHOLOGY | Facility: CLINIC | Age: 30
End: 2025-05-12
Payer: COMMERCIAL

## 2025-05-12 NOTE — TELEPHONE ENCOUNTER
Date: 2025    Client Name:  Dina Hoover  Preferred Name: Gaviota  MRN: [id number]   : 1995  Age:  29 year old    Presenting Problem / Reason for Assessment:     Pt experiences pain during sex, trouble with arousal, and has not been able to experience an orgasm. Pt reports that she has been working with Dr. Ludwig at the [facility] for the past 5 years to address medical/physical aspects of these issues and has also been doing pelvic floor therapy.     What is your goal/ hoped-for outcome for services?    Pt wants to engage in sex therapy to address any potential emotional/mental blocks or reasons for pain during sex.    Referring:     Current/Recent Mental Health Providers: No current general therapist     Current Mental Health Diagnoses (if any): major depression, general anxiety    Suggested Program:  [facility]    Interested in Couples/Family Therapy?  Yes: Potentially interested in couples in the future    Any current or past legal concerns we would need to know about?:   No    Pt is scheduled 06/10 for intake appt with Lety Zepeda. Intake packet sent to pt via [facility] to complete and return to clinic prior to appt.    Mark Varela on 2025 at 1:08 PM        Patient wishes to be contacted regarding Insurance benefits?:  No    Insurance Benefits to be evaluated.  Note will be entered when validated.    Please Verify Registration    Please send Welcome Packet and document date sent.

## 2025-05-15 ENCOUNTER — VIRTUAL VISIT (OUTPATIENT)
Dept: BEHAVIORAL HEALTH | Facility: CLINIC | Age: 30
End: 2025-05-15
Payer: COMMERCIAL

## 2025-05-15 DIAGNOSIS — F34.1 PERSISTENT DEPRESSIVE DISORDER: Primary | ICD-10-CM

## 2025-05-15 DIAGNOSIS — F41.1 GENERALIZED ANXIETY DISORDER: ICD-10-CM

## 2025-05-15 ASSESSMENT — ANXIETY QUESTIONNAIRES
5. BEING SO RESTLESS THAT IT IS HARD TO SIT STILL: NOT AT ALL
GAD7 TOTAL SCORE: 10
GAD7 TOTAL SCORE: 10
3. WORRYING TOO MUCH ABOUT DIFFERENT THINGS: MORE THAN HALF THE DAYS
6. BECOMING EASILY ANNOYED OR IRRITABLE: SEVERAL DAYS
2. NOT BEING ABLE TO STOP OR CONTROL WORRYING: MORE THAN HALF THE DAYS
7. FEELING AFRAID AS IF SOMETHING AWFUL MIGHT HAPPEN: SEVERAL DAYS
7. FEELING AFRAID AS IF SOMETHING AWFUL MIGHT HAPPEN: SEVERAL DAYS
GAD7 TOTAL SCORE: 10
1. FEELING NERVOUS, ANXIOUS, OR ON EDGE: SEVERAL DAYS
IF YOU CHECKED OFF ANY PROBLEMS ON THIS QUESTIONNAIRE, HOW DIFFICULT HAVE THESE PROBLEMS MADE IT FOR YOU TO DO YOUR WORK, TAKE CARE OF THINGS AT HOME, OR GET ALONG WITH OTHER PEOPLE: VERY DIFFICULT
4. TROUBLE RELAXING: NEARLY EVERY DAY
8. IF YOU CHECKED OFF ANY PROBLEMS, HOW DIFFICULT HAVE THESE MADE IT FOR YOU TO DO YOUR WORK, TAKE CARE OF THINGS AT HOME, OR GET ALONG WITH OTHER PEOPLE?: VERY DIFFICULT

## 2025-05-15 ASSESSMENT — PATIENT HEALTH QUESTIONNAIRE - PHQ9
10. IF YOU CHECKED OFF ANY PROBLEMS, HOW DIFFICULT HAVE THESE PROBLEMS MADE IT FOR YOU TO DO YOUR WORK, TAKE CARE OF THINGS AT HOME, OR GET ALONG WITH OTHER PEOPLE: EXTREMELY DIFFICULT
SUM OF ALL RESPONSES TO PHQ QUESTIONS 1-9: 20
SUM OF ALL RESPONSES TO PHQ QUESTIONS 1-9: 20

## 2025-05-15 NOTE — PROGRESS NOTES
"    [facility]         PATIENT'S NAME: Dina Hoover  PREFERRED NAME: Gaviota  PRONOUNS:   she/her    MRN: [id number]  : 1995  ADDRESS: [address] [place] [address]  ACCT. NUMBER:  [id number]  DATE OF SERVICE: 5/15/25  START TIME: 1.03  END TIME: 1.32  PREFERRED PHONE: [phone]  May we leave a program related message: Yes  EMERGENCY CONTACT: was not obtained  .  SERVICE MODALITY:  Video Visit:      Provider verified identity through the following two step process.  Patient provided:  Patient  and Patient address    Telemedicine Visit: The patient's condition can be safely assessed and treated via synchronous audio and visual telemedicine encounter.      Reason for Telemedicine Visit: Patient has requested telehealth visit    Originating Site (Patient Location): Patient's home    Distant Site (Provider Location): [facility] MENTAL HEALTH & ADDI[facility]    Consent:  The patient/guardian has verbally consented to: the potential risks and benefits of telemedicine (video visit) versus in person care; bill my insurance or make self-payment for services provided; and responsibility for payment of non-covered services.     Patient would like the video invitation sent by:  My Chart    Mode of Communication:  Video Conference via [facility]    Distant Location (Provider):  On-site    As the provider I attest to compliance with applicable laws and regulations related to telemedicine.    UNIVERSAL ADULT Mental Health DIAGNOSTIC ASSESSMENT    Identifying Information:  Patient is a 29 year old,   individual.  Patient was referred for an assessment by self.  Patient attended the session alone.    Chief Complaint:   The reason for seeking services at this time is: \"Severe treatment resistant depression, anxiety\".  The problem(s) began 01/01/15.    Patient has attempted to resolve these concerns in the past through individual therapy .    Social/Family " "History:  Patient reported they grew up in [place], [place].  They were raised by biological parents  .  Parents were always together.  Patient reported that their childhood was \"I had a good family, but I had severe anxiety and depression that was untreated\".  Patient described their current relationships with family of origin as intact.     The patient describes their cultural background as  and reports being a part of a Rastafarian growing up. Patient also reports being home-schooled until 3rd grade.  Cultural influences and impact on patient's life structure, values, norms, and healthcare: I am a lesbian and my wife is trans.  Contextual influences on patient's health include: Societal Factors : patient reports being home-schooled until 3rd grade.    These factors will be addressed in the Preliminary Treatment plan. Patient identified their preferred language to be English. Patient reported they does not need the assistance of an  or other support involved in therapy.     Patient reported had no significant delays in developmental tasks.   Patient's highest education level was college graduate  .  Patient identified the following learning problems: none reported.  Modifications will not be used to assist communication in therapy.  Patient reports they are  able to understand written materials.    Patient reported the following relationship history including current marriage.  Patient's current relationship status is  for almost a year.   Patient identified their sexual orientation as solano.  Patient reported having 0  child(prasad). Patient identified partner; mother; other as part of their support system.  Patient identified the quality of these relationships as good,  .      Patient's current living/housing situation involves staying in own home/apartment.  The immediate members of family and household include Susan Malone,Wife  and they report that housing is stable.    Patient is currently " employed fulltime.  Patient reports their finances are obtained through employment; spouse. Patient does identify finances as a current stressor.      Patient reported that they have not been involved with the legal system.    . Patient does not report being under probation/ parole/ jurisdiction. They are not under any current court jurisdiction. .    Patient's Strengths and Limitations:  Patient identified the following strengths or resources that will help them succeed in treatment: insight and intelligence. Things that may interfere with the patient's success in treatment include: none identified.     Assessments:  The following assessments were completed by patient for this visit:  PHQ9:       1/7/2020     1:14 PM 1/28/2020     1:20 PM 2/21/2020    11:05 AM 11/24/2021    12:46 PM 12/2/2021    11:00 AM 2/21/2025     3:37 PM 5/15/2025    11:29 AM   PHQ-9 SCORE   PHQ-9 Total Score MyChart       20 (Severe depression)   PHQ-9 Total Score 8 7 6 13 11 7 20        Patient-reported     GAD7:       1/3/2020     1:49 PM 1/7/2020     1:14 PM 1/28/2020     1:20 PM 2/21/2020    11:05 AM 8/29/2023    10:34 AM 5/15/2025    11:37 AM   CED-7 SCORE   Total Score 13 (moderate anxiety)    15 (severe anxiety) 10 (moderate anxiety)   Total Score 13 7 3 3 15 10        Patient-reported     PROMIS 10-Global Health (all questions and answers displayed):       5/15/2025    11:38 AM   PROMIS 10   In general, would you say your health is: Very good   In general, would you say your quality of life is: Good   In general, how would you rate your physical health? Very good   In general, how would you rate your mental health, including your mood and your ability to think? Fair   In general, how would you rate your satisfaction with your social activities and relationships? Fair   In general, please rate how well you carry out your usual social activities and roles Fair   To what extent are you able to carry out your everyday physical activities  "such as walking, climbing stairs, carrying groceries, or moving a chair? Completely   In the past 7 days, how often have you been bothered by emotional problems such as feeling anxious, depressed, or irritable? Always   In the past 7 days, how would you rate your fatigue on average? Moderate   In the past 7 days, how would you rate your pain on average, where 0 means no pain, and 10 means worst imaginable pain? 2   In general, would you say your health is: 4   In general, would you say your quality of life is: 3   In general, how would you rate your physical health? 4   In general, how would you rate your mental health, including your mood and your ability to think? 2   In general, how would you rate your satisfaction with your social activities and relationships? 2   In general, please rate how well you carry out your usual social activities and roles. (This includes activities at home, at work and in your community, and responsibilities as a parent, child, spouse, employee, friend, etc.) 2   To what extent are you able to carry out your everyday physical activities such as walking, climbing stairs, carrying groceries, or moving a chair? 5   In the past 7 days, how often have you been bothered by emotional problems such as feeling anxious, depressed, or irritable? 5   In the past 7 days, how would you rate your fatigue on average? 3   In the past 7 days, how would you rate your pain on average, where 0 means no pain, and 10 means worst imaginable pain? 2   Global Mental Health Score 8    Global Physical Health Score 16    PROMIS TOTAL - SUBSCORES 24        Patient-reported     Chalmers Suicide Severity Rating Scale (Lifetime/Recent)      5/15/2025     1:03 PM   Chalmers Suicide Severity Rating (Lifetime/Recent)   1. Wish to be Dead (Lifetime) Y   Wish to be Dead Description (Lifetime) \"I've wanted to be dead, but never had any plan or intent. Life just feels hard sometimes.\"   1. Wish to be Dead (Past 1 Month) Y " "  Wish to be Dead Description (Past 1 Month) \"I've wanted to be dead, but never had any plan or intent. Life just feels hard sometimes.\"   2. Non-Specific Active Suicidal Thoughts (Lifetime) N   Most Severe Ideation Rating (Lifetime) 2   Most Severe Ideation Rating (Past 1 Month) 2   Frequency (Lifetime) 1   Frequency (Past 1 Month) 1   Duration (Lifetime) 3   Duration (Past 1 Month) 1   Controllability (Lifetime) 4   Controllability (Past 1 Month) 2   Deterrents (Lifetime) 1   Deterrents (Past 1 Month) 0   Reasons for Ideation (Lifetime) 4   Reasons for Ideation (Past 1 Month) 4   Actual Attempt (Lifetime) N   Has subject engaged in non-suicidal self-injurious behavior? (Lifetime) N   Interrupted Attempts (Lifetime) N   Aborted or Self-Interrupted Attempt (Lifetime) N   Preparatory Acts or Behavior (Lifetime) N   Calculated C-SSRS Risk Score (Lifetime/Recent) Low Risk       Personal and Family Medical History:  Patient does report a family history of mental health concerns.  Patient reports family history includes Anxiety Disorder in her mother, sister, and sister; Depression in her mother and sister; Genetic Disorder in her mother; Other Cancer in her maternal grandfather; Substance Abuse in her maternal grandfather..     Patient does report Mental Health Diagnosis and/or Treatment.  Patient reported the following previous diagnoses which include(s): an anxiety disorder; depression .  Patient reported symptoms began in childhood.  Patient has received mental health services in the past:  therapy; psychiatry  .  Psychiatric Hospitalizations: none when   ,  ,  ,  ,  ,  ,  ,  ,  ,  ,  .    Patient denies a history of civil commitment.      Currently, patient otherMedication - prozac is receiving other mental health services.  These include psychiatry services..       Patient has had a physical exam to rule out medical causes for current symptoms.  Date of last physical exam was greater than a year ago and client was " encouraged to schedule an exam with PCP. The patient has a [facility] Primary Care Provider, who is named Roc Kinney..  Patient reports no current medical concerns.  Patient reports pain concerns including chronic back pain.  Patient's pain provider is getting massages and taking bathes..   There are not significant appetite / nutritional concerns / weight changes.   Patient does report a history of head injury / trauma / cognitive impairment.  Patient reports having a concussion when she was 16.     Current Outpatient Medications   Medication Sig Dispense Refill    cetirizine HCl 10 MG CAPS       cholecalciferol (VITAMIN D) 200 units (5 mcg) TABS half-tab       diazepam (VALIUM) 5 MG tablet Take 1 tablet (5 mg) by mouth once as needed for anxiety (before procedure) 1 tablet 0    estradiol (ESTRACE) 0.1 MG/GM vaginal cream Place 2 g vaginally twice a week. Every night for 1 week, then twice weekly at night afterward. 42.5 g 3    FLUoxetine (PROZAC) 20 MG capsule Take 1 pill daily for a total of 60mg 90 capsule 3    FLUoxetine (PROZAC) 40 MG capsule Take 1 pill daily for a total of 60mg 90 capsule 3    melatonin 5 MG tablet Take 5 mg by mouth nightly as needed       norethindrone (MICRONOR) 0.35 MG tablet Take 1 tablet (0.35 mg) by mouth daily. 84 tablet 3     No current facility-administered medications for this visit.         Medication Adherence:  Patient reports taking.  taking psychiatric medications as prescribed.    Patient Allergies:    Allergies   Allergen Reactions    Cats Itching    Dogs Itching    Grass Itching    Pineapple Itching       Medical History:    Past Medical History:   Diagnosis Date    Back pain     Depression     Generalized anxiety disorder          Current Mental Status Exam:   Appearance:  Appropriate    Eye Contact:  Fair   Psychomotor:  Normal       Gait / station:  not observed  Attitude / Demeanor: Cooperative   Speech      Rate / Production: Normal/ Responsive      Volume:  Normal   volume      Language:  intact  Mood:   Anxious  Depressed   Affect:   Appropriate    Thought Content: Clear   Thought Process: Logical       Associations: No loosening of associations  Insight:   Good   Judgment:  Intact   Orientation:  All  Attention/concentration: Good    Substance Use:   Patient did not report a family history of substance use concerns; see medical history section for details.  Patient has not received chemical dependency treatment in the past.  Patient has not ever been to detox.      Patient is not currently receiving any chemical dependency treatment.           Substance History of use Age of first use Date of last use     Pattern and duration of use (include amounts and frequency)   Alcohol never used       REPORTS SUBSTANCE USE: N/A   Cannabis   never used     REPORTS SUBSTANCE USE: N/A     Amphetamines   never used     REPORTS SUBSTANCE USE: N/A   Cocaine/crack    never used       REPORTS SUBSTANCE USE: N/A   Hallucinogens never used         REPORTS SUBSTANCE USE: N/A   Inhalants never used         REPORTS SUBSTANCE USE: N/A   Heroin never used         REPORTS SUBSTANCE USE: N/A   Other Opiates never used     REPORTS SUBSTANCE USE: N/A   Benzodiazepine   never used     REPORTS SUBSTANCE USE: N/A   Barbiturates never used     REPORTS SUBSTANCE USE: N/A   Over the counter meds never used     REPORTS SUBSTANCE USE: N/A   Caffeine currently use 17  5/15/25 REPORTS SUBSTANCE USE: reports using substance 1 times per day and has 1 cup of coffee at a time.   Patient reports heaviest use is current use.   Nicotine  never used     REPORTS SUBSTANCE USE: N/A   Other substances not listed above:  Identify:  never used     REPORTS SUBSTANCE USE: N/A     Patient reported the following problems as a result of their substance use: no problems, not applicable.  Patient reports obtaining substances from N/A.    Substance Use: No symptoms    Based on the CAGE score of 0 and clinical interview there  are not  indications of drug or alcohol abuse.    Significant Losses / Trauma / Abuse / Neglect Issues:   Patient did not did not serve in the .  There are not indications or report of significant loss, trauma, abuse or neglect issues related to: are no indications and client denies any losses, trauma, abuse, or neglect concerns.  Patient has not been a victim of exploitation.  Concerns for possible neglect are not present.     Safety Assessment:   Patient denies current or past homicidal ideation and behaviors.  Patient denies current/recent suicide ideation, plans, intent, or attempts but reports a history of some passive SI, but denies any plan or intent. Patient denied any safety concerns or need for a safety plan. Patient was encouraged to reach out to crisis resources or call [phone] if anything changes before next session.  Patient denies current or past self-injurious behaviors.  Patient denied risk behaviors associated with substance use.  Patient denies any high risk behaviors associated with mental health symptoms.  Patient denied current or past personal safety concerns.    Patient denies past of current/recent assaultive behaviors.    Patient denied a history of sexual assault behaviors.     Patient reports there are not are not,   firearms in the house.    Patient reports the following protective factors: identifies reason for living forward or future oriented thinking; dedication to family or friends; sense of personal control or determination; access to a variety of clinical interventions and pets    Risk Plan:  See Recommendations for Safety and Risk Management Plan    Review of Symptoms per patient report:   Depression: Lack of interest or pleasure in doing things, Feeling sad, down, or depressed, Feelings of hopelessness, Change in energy level, Change in sleep, Difficulties concentrating, Suicidal ideation, and Irritability  Alexandra:  No Symptoms  Psychosis: No Symptoms  Anxiety: Excessive worry,  Nervousness, Physical complaints, such as headaches, stomachaches, muscle tension, Sleep disturbance, Ruminations, and Irritability  Panic:  Shortness of breath and Sense of impending doom  Post Traumatic Stress Disorder:  No Symptoms   Eating Disorder: No Symptoms  ADD / ADHD:  No symptoms  Conduct Disorder: No symptoms  Autism Spectrum Disorder: No symptoms  Obsessive Compulsive Disorder: No Symptoms  Personality Disorders:  No symptoms    Patient reports the following compulsive behaviors and treatment history: None.      Diagnostic Criteria:   Persistent Depressive Disorder  A. Depressed mood for most of the day, for more days than not, as indicated either by subjective account or observation by others, for at least 2 years. Note: In children and adolescents, mood can be irritable and duration must be at least 1 year.   B. Presence, while depressed, of two (or more) of the following:        - poor appetite or overeating        - insomnia or hypersomnia       - low energy or fatigue        - low self-esteem        - feelings of hopelessness   C. During the 2-year period (1 year for children or adolescents) of the disturbance, the person has never been without the symptoms in Criteria A and B for more than 2 months at a time.  D. Criteria for a major depressive disorder may be continously present for 2 years  E. There has never been a Manic Episode, a Mixed Episode, or a Hypomanic Episode, and criteria have never been met for Cyclothymic Disorder.   F. The disturbance is not better explained by a persistent schizoaffective disorder, schizophrenia, delusional disorder, or other specified or unspecified schizophrenia spectrum and other psychotic disorder  G. The symptoms are not attributable to the physiological effects of a substance (e.g., a drug of abuse, a medication) or another medical condition (e.g., hypothyroidism).   H. The symptoms cause clinically significant distress or impairment in social,  occupational, or other important areas of functioning.        - Early Onset: onset is before age 21 years     Functional Status:  Patient reports the following functional impairments:  management of the household and or completion of tasks and relationship(s).     Nonprogrammatic care:  Patient is requesting basic services to address current mental health concerns.    Clinical Summary:  1. Psychosocial Factors:  Relationship concerns.  Cultural and Contextual Factors: Patient identifies as a 29 year old , solano,  female.  2. Principal DSM5 Diagnoses  (Sustained by DSM5 Criteria Listed Above):   300.4 (F34.1) Persistent Depressive Disorder, Early onset  300.02 (F41.1) Generalized Anxiety Disorder.  3. Other Diagnoses that is relevant to services: none  4. Provisional Diagnosis:  none  5. Prognosis: Expect Improvement.  6. Likely consequences of symptoms if not treated: Patient's condition would likely worsen, requiring a higher level of care..  7. Patient strengths include:  insightful and intelligent .     Recommendations:     1. Plan for Safety and Risk Management:   Safety and Risk: Recommended that patient call 911 or go to the local ED should there be a change in any of these risk factors        Report to child / adult protection services was NA.     2. Patient's identified mental health concerns indicating individual therapy.     3. Initial Treatment will focus on:    Depressed Mood - decrease depression symptoms  Anxiety - decrease anxiety symptoms.     4. Resources/Service Plan:    services are not indicated.   Modifications to assist communication are not indicated.   Additional disability accommodations are not indicated.      5. Collaboration:   Collaboration / coordination of treatment will be initiated with the following  support professionals: none at this time.      6.  Referrals:   The following referral(s) will be initiated: none at this time.       A Release of Information  has been obtained for the following: N/A.     Clinical Substantiation/medical necessity for the above recommendations:  N/A.    7. JUDITH:    JUDITH:  Discussed the general effects of drugs and alcohol on health and well-being. Provider gave patient information about the  effects of chemical use on their health and well being. Recommendations:  none needed at this time .     8. Records:   These were reviewed at time of assessment.   Information in this assessment was obtained from the medical record and  provided by patient who is a good historian.    Patient will have open access to their mental health medical record.    9.   Interactive Complexity: No    10. Safety Plan: No Safety plan indicated. Patient will call 911 if anything changes or feeling unsafe.    Provider Name/ Credentials:  EARNEST Segundo    May 15, 2025

## 2025-06-09 ASSESSMENT — ANXIETY QUESTIONNAIRES
7. FEELING AFRAID AS IF SOMETHING AWFUL MIGHT HAPPEN: SEVERAL DAYS
3. WORRYING TOO MUCH ABOUT DIFFERENT THINGS: SEVERAL DAYS
1. FEELING NERVOUS, ANXIOUS, OR ON EDGE: MORE THAN HALF THE DAYS
GAD7 TOTAL SCORE: 8
2. NOT BEING ABLE TO STOP OR CONTROL WORRYING: MORE THAN HALF THE DAYS
6. BECOMING EASILY ANNOYED OR IRRITABLE: SEVERAL DAYS
5. BEING SO RESTLESS THAT IT IS HARD TO SIT STILL: NOT AT ALL
IF YOU CHECKED OFF ANY PROBLEMS ON THIS QUESTIONNAIRE, HOW DIFFICULT HAVE THESE PROBLEMS MADE IT FOR YOU TO DO YOUR WORK, TAKE CARE OF THINGS AT HOME, OR GET ALONG WITH OTHER PEOPLE: SOMEWHAT DIFFICULT
4. TROUBLE RELAXING: SEVERAL DAYS

## 2025-06-09 ASSESSMENT — PATIENT HEALTH QUESTIONNAIRE - PHQ9: SUM OF ALL RESPONSES TO PHQ QUESTIONS 1-9: 11

## 2025-06-10 ENCOUNTER — VIRTUAL VISIT (OUTPATIENT)
Dept: PSYCHOLOGY | Facility: CLINIC | Age: 30
End: 2025-06-10
Payer: COMMERCIAL

## 2025-06-10 DIAGNOSIS — F34.1 PERSISTENT DEPRESSIVE DISORDER: ICD-10-CM

## 2025-06-10 DIAGNOSIS — F41.1 GENERALIZED ANXIETY DISORDER: ICD-10-CM

## 2025-06-10 DIAGNOSIS — F52.31 ANORGASMIA OF FEMALE: Primary | ICD-10-CM

## 2025-06-10 ASSESSMENT — ANXIETY QUESTIONNAIRES
8. IF YOU CHECKED OFF ANY PROBLEMS, HOW DIFFICULT HAVE THESE MADE IT FOR YOU TO DO YOUR WORK, TAKE CARE OF THINGS AT HOME, OR GET ALONG WITH OTHER PEOPLE?: SOMEWHAT DIFFICULT
3. WORRYING TOO MUCH ABOUT DIFFERENT THINGS: SEVERAL DAYS
GAD7 TOTAL SCORE: 8
1. FEELING NERVOUS, ANXIOUS, OR ON EDGE: MORE THAN HALF THE DAYS
5. BEING SO RESTLESS THAT IT IS HARD TO SIT STILL: NOT AT ALL
7. FEELING AFRAID AS IF SOMETHING AWFUL MIGHT HAPPEN: SEVERAL DAYS
2. NOT BEING ABLE TO STOP OR CONTROL WORRYING: MORE THAN HALF THE DAYS
GAD7 TOTAL SCORE: 8
6. BECOMING EASILY ANNOYED OR IRRITABLE: SEVERAL DAYS
IF YOU CHECKED OFF ANY PROBLEMS ON THIS QUESTIONNAIRE, HOW DIFFICULT HAVE THESE PROBLEMS MADE IT FOR YOU TO DO YOUR WORK, TAKE CARE OF THINGS AT HOME, OR GET ALONG WITH OTHER PEOPLE: SOMEWHAT DIFFICULT
4. TROUBLE RELAXING: SEVERAL DAYS
GAD7 TOTAL SCORE: 8
7. FEELING AFRAID AS IF SOMETHING AWFUL MIGHT HAPPEN: SEVERAL DAYS

## 2025-06-10 ASSESSMENT — PATIENT HEALTH QUESTIONNAIRE - PHQ9
10. IF YOU CHECKED OFF ANY PROBLEMS, HOW DIFFICULT HAVE THESE PROBLEMS MADE IT FOR YOU TO DO YOUR WORK, TAKE CARE OF THINGS AT HOME, OR GET ALONG WITH OTHER PEOPLE: VERY DIFFICULT
SUM OF ALL RESPONSES TO PHQ QUESTIONS 1-9: 11
SUM OF ALL RESPONSES TO PHQ QUESTIONS 1-9: 11

## 2025-06-10 NOTE — NURSING NOTE
Is the patient currently in the state of [place]? YES    Current patient location: [address] [place] [address] 6  [place] [address]    Visit mode:Video    If the visit is dropped, the patient can be reconnected by: VIDEO VISIT: Text to cell phone:   Telephone Information:   Mobile [phone]       Will anyone else be joining the visit? No  (If patient encounters technical issues they should call [phone])    Are changes needed to the allergy or medication list? No    Are refills needed on medications prescribed by this physician? No    Rooming Documentation: Questionnaire(s) completed.    Reason for visit: Consult     WAYNE Rolon

## 2025-06-10 NOTE — PROGRESS NOTES
[facility] - Progress Note    Date of Service: 6/10/25   Name: Dina Hoover  : 1995  Medical Record Number: [id number]  Treating Provider: KENRICK Parmar  Type of Session: Individual (DA already on file from one month ago)  Present in Session: Dina  Session Start and Stop Time: 3:00-3:45  Number of Minutes:  45    SERVICE MODALITY:  Video Visit:      Provider verified identity through the following two step process.  Patient provided:  Patient is known previously to provider    Telemedicine Visit: The patient's condition can be safely assessed and treated via synchronous audio and visual telemedicine encounter.      Reason for Telemedicine Visit: Patient unable to travel    Originating Site (Patient Location): Patient's home    Distant Site (Provider Location): [facility]    Consent:  The patient/guardian has verbally consented to: the potential risks and benefits of telemedicine (video visit) versus in person care; bill my insurance or make self-payment for services provided; and responsibility for payment of non-covered services.     Patient would like the video invitation sent by:  Send to e-mail at: [email or web address]    Mode of Communication:  Video Conference via [facility]    Distant Location (Provider):  On-site    As the provider I attest to compliance with applicable laws and regulations related to telemedicine.    DSM-5 Diagnoses:  Encounter Diagnoses   Name Primary?    Persistent depressive disorder     Generalized anxiety disorder     Anorgasmia of female Yes        Current Reported Symptoms and Status update:  Anhedonia, feeling down, insomnia, low energy, appetitive issues, feeling bad about self, difficulty concentrating    Feeling nervous, anxious, worrying, trouble relaxing, irritability    History of anorgasmia, 0% of time. Finds this very distressing and causes some issues in relationship    Changes since  last session - Gaviota reported fewer mental health symptoms than when she did a DA with another provider last month, no SI    Answers submitted by the patient for this visit:  Patient Health Questionnaire (Submitted on 6/10/2025)  If you checked off any problems, how difficult have these problems made it for you to do your work, take care of things at home, or get along with other people?: Very difficult  PHQ9 TOTAL SCORE: 11  Patient Health Questionnaire (G7) (Submitted on 6/10/2025)  CED 7 TOTAL SCORE: 8      Progress Toward Treatment Goals:   Beginning of treatment; first session with this provider    Therapeutic Interventions/Treatment Strategies:    Area(s) of treatment focus addressed in this session included Symptom Management and Sexual Health and Wellness    First session - clarified information from DA, discussed policies and confidentiality, initial conversation about therapy goals. Goals will be set at the next session.     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 That was successful.     in 2024 to Kira. They have been together for 7 years, met in college. Kira recently diagnosed with ADHD and she is learning about how this impacts her.    Hobbies - video games, crafts  Finds current job stressful and would like to leave in the next year or so. Currently her job is slower right now until the end of July.    Hates driving and doesn't drive. Needs to do virtual appts.    Attempts to solve issue - sex toys, erotica. Wife thinks she is not attracted to her. Wife provides feedback that she needs to relax and not let her mind wander. Gaviota feels it may be a physical problem. Relaxation homework that doesn't help - meditation, guided imagery, helps - taking hot bath, yoga    Biggest issue seems to be losing sensation. She noted that talking about sex was mortifying and she doesn't like to have to  address this. However, open to exploring some tools. Is reading come as you are. Suggested OMG yes - with intent to not try and orgasm, but instead focus on what she can feel.     Education around orgasm and typical age      Treatment modalities used include Solution Focused Therapy Sexual Health and Wellness Education  Support and Education    Patient Response:   Patient responded to session by verbalizing understanding  Possible barriers to participation / learning include: contextual issues    Current Mental Status Exam:   Appearance:  Appropriate   Eye Contact:  Fair   Attitude / Demeanor: Cooperative   Speech      Rate / Production: Normal/ Responsive      Volume:  Normal  volume  Orientation:  All  Mood:   Anxious   Affect:   Appropriate   Thought Content: Clear   Insight:   Fair       Plan/Need for Future Services:  Return for therapy in 3 weeks to treat diagnosed problems.    Patient has current DA, will do tx plan at next session    Referral / Collaboration:  Referral to another professional/service is not indicated at this time..  Emergency Services Needed?  No    Assignment:  Visit omg yes    Interactive Complexity:  There are four specific communication difficulties that complicate the work of the primary psychiatric procedure.  Interactive complexity (+[id number]) may be reported when at least one of these difficulties is present.    Communication difficulties present during current the psychiatric procedure include:  None.      Signature/Title:    Yomaira Zepeda, PhD, LMFT

## 2025-06-17 ASSESSMENT — PATIENT HEALTH QUESTIONNAIRE - PHQ9
10. IF YOU CHECKED OFF ANY PROBLEMS, HOW DIFFICULT HAVE THESE PROBLEMS MADE IT FOR YOU TO DO YOUR WORK, TAKE CARE OF THINGS AT HOME, OR GET ALONG WITH OTHER PEOPLE: VERY DIFFICULT
SUM OF ALL RESPONSES TO PHQ QUESTIONS 1-9: 8
SUM OF ALL RESPONSES TO PHQ QUESTIONS 1-9: 8

## 2025-06-18 ENCOUNTER — VIRTUAL VISIT (OUTPATIENT)
Dept: BEHAVIORAL HEALTH | Facility: CLINIC | Age: 30
End: 2025-06-18
Payer: COMMERCIAL

## 2025-06-18 DIAGNOSIS — F34.1 PERSISTENT DEPRESSIVE DISORDER: Primary | ICD-10-CM

## 2025-06-18 DIAGNOSIS — F41.1 GENERALIZED ANXIETY DISORDER: ICD-10-CM

## 2025-06-18 PROCEDURE — 90834 PSYTX W PT 45 MINUTES: CPT | Mod: 95

## 2025-06-18 NOTE — PROGRESS NOTES
[facility]                                     Progress Note    Patient Name: Dina Hoover  Date: 25           Service Type: Individual      Session Start Time: 2.00  Session End Time: 2.39     Session Length: 38-52 minutes    Session #: 1    Attendees: Client attended alone    Service Modality:  Video Visit:      Provider verified identity through the following two step process.  Patient provided:  Patient  and Patient address    Telemedicine Visit: The patient's condition can be safely assessed and treated via synchronous audio and visual telemedicine encounter.      Reason for Telemedicine Visit: Patient has requested telehealth visit    Originating Site (Patient Location): Patient's home    Distant Site (Provider Location): [facility]    Consent:  The patient/guardian has verbally consented to: the potential risks and benefits of telemedicine (video visit) versus in person care; bill my insurance or make self-payment for services provided; and responsibility for payment of non-covered services.     Patient would like the video invitation sent by:  My Chart    Mode of Communication:  Video Conference via [facility]    Distant Location (Provider):  Off-site    As the provider I attest to compliance with applicable laws and regulations related to telemedicine.    DATA  Interactive Complexity: No  Crisis: No        Progress Since Last Session (Related to Symptoms / Goals / Homework):   Symptoms: Improving . Patient's PHQ9 and GAD7 scores have decreased for remained relatively stable.    Homework: Partially completed      Episode of Care Goals: Satisfactory progress - PREPARATION (Decided to change - considering how); Intervened by negotiating a change plan and determining options / strategies for behavior change, identifying triggers, exploring social supports, and working towards setting a date to begin behavior change     Current /  Ongoing Stressors and Concerns:   Patient discussed her wife losing her job and currently looking for other employment opportunities. Patient discussed a desire to work on not taking home work with her. Patient discussed anxious ruminations. Patient discussed her affinity for crafts and sewing, but not having enough room in her apartment. Patient discussed planning on taking graduate school courses this fall in Data Analytics. Patient discussed taking baths for self-care. Patient discussed her wife, Kira, getting diagnosed with ADHD recently. Patient discussed stressors related to her two younger sisters who still live with her parents.         Treatment Objective(s) Addressed in This Session:   Decrease frequency and intensity of feeling down, depressed, hopeless  Decrease frequency and intensity of feeling anxious, nervous, worried       Intervention:   Motivational Interviewing    MI Intervention: Expressed Empathy/Understanding, Supported Autonomy, Collaboration, Evocation, Open-ended questions, and Reflections: simple and complex     Change Talk Expressed by the Patient: Desire to change    Provider Response to Change Talk: E - Evoked more info from patient about behavior change, A - Affirmed patient's thoughts, decisions, or attempts at behavior change, and R - Reflected patient's change talk    Assessments completed prior to visit:  The following assessments were completed by patient for this visit:  PHQ9:       11/24/2021    12:46 PM 12/2/2021    11:00 AM 2/21/2025     3:37 PM 5/15/2025    11:29 AM 6/9/2025     9:52 AM 6/10/2025     1:56 PM 6/17/2025     3:37 PM   PHQ-9 SCORE   PHQ-9 Total Score MyChart    20 (Severe depression)  11 (Moderate depression) 8 (Mild depression)   PHQ-9 Total Score 13 11 7 20  11  11  8        Patient-reported     GAD7:       1/7/2020     1:14 PM 1/28/2020     1:20 PM 2/21/2020    11:05 AM 8/29/2023    10:34 AM 5/15/2025    11:37 AM 6/9/2025     9:53 AM 6/10/2025     1:56 PM    CED-7 SCORE   Total Score    15 (severe anxiety) 10 (moderate anxiety)  8 (mild anxiety)   Total Score 7 3 3 15 10  8  8        Patient-reported     PROMIS 10-Global Health (all questions and answers displayed):       5/15/2025    11:38 AM 6/9/2025     9:54 AM 6/10/2025     1:56 PM   PROMIS 10   In general, would you say your health is: Very good     In general, would you say your quality of life is: Good     In general, how would you rate your physical health? Very good     In general, how would you rate your mental health, including your mood and your ability to think? Fair     In general, how would you rate your satisfaction with your social activities and relationships? Fair     In general, please rate how well you carry out your usual social activities and roles Fair     To what extent are you able to carry out your everyday physical activities such as walking, climbing stairs, carrying groceries, or moving a chair? Completely     In the past 7 days, how often have you been bothered by emotional problems such as feeling anxious, depressed, or irritable? Always     In the past 7 days, how would you rate your fatigue on average? Moderate     In the past 7 days, how would you rate your pain on average, where 0 means no pain, and 10 means worst imaginable pain? 2     In general, would you say your health is: 4     In general, would you say your quality of life is: 3     In general, how would you rate your physical health? 4     In general, how would you rate your mental health, including your mood and your ability to think? 2     In general, how would you rate your satisfaction with your social activities and relationships? 2     In general, please rate how well you carry out your usual social activities and roles. (This includes activities at home, at work and in your community, and responsibilities as a parent, child, spouse, employee, friend, etc.) 2     To what extent are you able to carry out your everyday  "physical activities such as walking, climbing stairs, carrying groceries, or moving a chair? 5     In the past 7 days, how often have you been bothered by emotional problems such as feeling anxious, depressed, or irritable? 5     In the past 7 days, how would you rate your fatigue on average? 3     In the past 7 days, how would you rate your pain on average, where 0 means no pain, and 10 means worst imaginable pain? 2     Global Mental Health Score 8      Global Physical Health Score 16      PROMIS TOTAL - SUBSCORES 24          Information is confidential and restricted. Go to Review Flowsheets to unlock data.    Patient-reported     [place] Suicide Severity Rating Scale (Lifetime/Recent)      5/15/2025     1:03 PM   [place] Suicide Severity Rating (Lifetime/Recent)   1. Wish to be Dead (Lifetime) Y   Wish to be Dead Description (Lifetime) \"I've wanted to be dead, but never had any plan or intent. Life just feels hard sometimes.\"   1. Wish to be Dead (Past 1 Month) Y   Wish to be Dead Description (Past 1 Month) \"I've wanted to be dead, but never had any plan or intent. Life just feels hard sometimes.\"   2. Non-Specific Active Suicidal Thoughts (Lifetime) N   Most Severe Ideation Rating (Lifetime) 2   Most Severe Ideation Rating (Past 1 Month) 2   Frequency (Lifetime) 1   Frequency (Past 1 Month) 1   Duration (Lifetime) 3   Duration (Past 1 Month) 1   Controllability (Lifetime) 4   Controllability (Past 1 Month) 2   Deterrents (Lifetime) 1   Deterrents (Past 1 Month) 0   Reasons for Ideation (Lifetime) 4   Reasons for Ideation (Past 1 Month) 4   Actual Attempt (Lifetime) N   Has subject engaged in non-suicidal self-injurious behavior? (Lifetime) N   Interrupted Attempts (Lifetime) N   Aborted or Self-Interrupted Attempt (Lifetime) N   Preparatory Acts or Behavior (Lifetime) N   Calculated C-SSRS Risk Score (Lifetime/Recent) Low Risk         ASSESSMENT: Current Emotional / Mental Status (status of significant " symptoms):   Risk status (Self / Other harm or suicidal ideation)   Patient denies current fears or concerns for personal safety.   Patient reports the following current or recent suicidal ideation or behaviors: Patient reports some passive SI, but denies any plan or intent. Patient's safety plan has been updated. Patient was encouraged to review/utilize safety plan as necessary and reach out to crisis resources or call [phone] if anything changes before next session.    Patient denies current or recent homicidal ideation or behaviors.   Patient denies current or recent self injurious behavior or ideation.   Patient denies other safety concerns.   Patient reports there has been no change in risk factors since their last session.     Patient reports there has been no change in protective factors since their last session.     A safety and risk management plan has been developed including: Patient consented to co-developed safety plan.  Safety and risk management plan was completed - see below.  Patient agreed to use safety plan should any safety concerns arise.  A copy was given to the patient.     Appearance:   Appropriate    Eye Contact:   Poor   Psychomotor Behavior: Normal    Attitude:   Cooperative    Orientation:   All   Speech    Rate / Production: Normal/ Responsive    Volume:  Normal    Mood:    Anxious  Depressed    Affect:    Appropriate    Thought Content:  Clear    Thought Form:  Coherent  Logical    Insight:    Good      Medication Review:   No changes to current psychiatric medication(s)     Medication Compliance:   Yes     Changes in Health Issues:   None reported     Chemical Use Review:   Substance Use: Chemical use reviewed, no active concerns identified      Tobacco Use: No current tobacco use.      Diagnosis:  1. Persistent depressive disorder    2. Generalized anxiety disorder        Collateral Reports Completed:   Not Applicable    PLAN: (Patient Tasks / Therapist Tasks / Other)  Patient will  return in 2 weeks for next session. Patient will engage in self-care activities. Patient will work on mindfulness skills to increase awareness of triggers to anxiety and depression. Patient will work on going for walks (preferably outside) for at least 30 minutes, 3x/ week. Patient will continue tracking her mood daily through a mood tracker kandi.       Abida Lewis, [facility]                                                         ______________________________________________________________________    Individual Treatment Plan    Patient's Name: Dina Hoover  YOB: 1995    Date of Creation: 6/18/25  Date Treatment Plan Last Reviewed/Revised: 6/18/25    DSM5 Diagnoses:   Encounter Diagnoses   Name Primary?    Persistent depressive disorder Yes    Generalized anxiety disorder        Psychosocial / Contextual Factors: Occupational stressors, Relationship concerns.  PROMIS (reviewed every 90 days):   PROMIS-10 as of 5/15/25    Global Mental Health Score (P)  8   Global Physical Health Score (P)  16   PROMIS TOTAL - SUBSCORES (P)  24       Referral / Collaboration:  Referral to another professional/service is not indicated at this time..    Anticipated number of session for this episode of care: 9-12 sessions  Anticipation frequency of session: Biweekly  Anticipated Duration of each session: 38-52 minutes  Treatment plan will be reviewed in 90 days or when goals have been changed.       MeasurableTreatment Goal(s) related to diagnosis / functional impairment(s)  Goal 1: Patient will reduce anxiety symptoms as evidenced by a reduction in their GAD7 score to a 4 or less in the next 3 months.    I will know I've met my goal when I'm ruminating on work less, and out of work hours especially.      Objective #A (Patient Action)    Patient will identify at least 3 triggers for anxiety.  Status: New - Date: 6/18/25     Intervention(s)  Therapist will teach mindfulness and coping skills.    Objective  #B  Patient will use thought-stopping strategy daily to reduce intrusive thoughts.  Status: New - Date: 6/18/25     Intervention(s)  Therapist will teach thought-challenging skills.    Objective #C  Patient will exercise for 30 minutes 3 times per week for at least 30 minutes.  Status: New - Date: 6/18/25     Intervention(s)  Therapist will provide psycho-education on the impact and effects of exercise on mental health. .      Goal 2: Patient will reduce depression symptoms as evidenced by a reduction in their PHQ9 score to a 4 or less in the next 3 months.     I will know I've met my goal when I'm not in as much physical pain.      Objective #A (Patient Action)    Status: New - Date: 6/18/25     Patient will Identify negative self-talk and behaviors: challenge core beliefs, myths, and actions.    Intervention(s)  Therapist will provide psycho-education on cognitive distortions and teach reframing techniques.    Objective #B  Patient will Increase interest, engagement, and pleasure in doing things.    Status: New - Date: 6/18/25     Intervention(s)  Therapist will teach self-care and mindfulness skills.    Objective #C  Patient will Decrease frequency and intensity of feeling down, depressed, hopeless.  Status: New - Date: 6/18/25     Intervention(s)  Therapist will teach distress tolerance and coping skills.      Patient has reviewed and agreed to the above plan.      Abida Lewis, [facility]  June 18, 2025

## (undated) DEVICE — SUCTION MANIFOLD DORNOCH ULTRA CART UL-CL500

## (undated) DEVICE — LINEN GOWN X4 5410

## (undated) DEVICE — Device

## (undated) DEVICE — SOL WATER IRRIG 1000ML BOTTLE 2F7114

## (undated) DEVICE — ESU GROUND PAD UNIVERSAL W/O CORD

## (undated) DEVICE — JELLY LUBRICATING SURGILUBE 2OZ TUBE 0281-0205-02

## (undated) DEVICE — LINEN TOWEL PACK X5 5464

## (undated) DEVICE — BLADE KNIFE SURG 15 371115

## (undated) DEVICE — DECANTER TRANSFER DEVICE 2008S

## (undated) DEVICE — SU VICRYL 3-0 SH 27" J316H

## (undated) DEVICE — STRAP KNEE/BODY 31143004

## (undated) DEVICE — GLOVE PROTEXIS W/NEU-THERA 6.5  2D73TE65

## (undated) DEVICE — SOL NACL 0.9% IRRIG 1000ML BOTTLE 2F7124

## (undated) DEVICE — PAD CHUX UNDERPAD 30X36" P3036C

## (undated) DEVICE — GOWN XLG DISP 9545

## (undated) DEVICE — GLOVE PROTEXIS BLUE W/NEU-THERA 7.0  2D73EB70

## (undated) RX ORDER — IBUPROFEN 600 MG/1
TABLET, FILM COATED ORAL
Status: DISPENSED
Start: 2020-02-04

## (undated) RX ORDER — DEXAMETHASONE SODIUM PHOSPHATE 4 MG/ML
INJECTION, SOLUTION INTRA-ARTICULAR; INTRALESIONAL; INTRAMUSCULAR; INTRAVENOUS; SOFT TISSUE
Status: DISPENSED
Start: 2020-02-04

## (undated) RX ORDER — PHENAZOPYRIDINE HYDROCHLORIDE 200 MG/1
TABLET, FILM COATED ORAL
Status: DISPENSED
Start: 2020-02-04

## (undated) RX ORDER — FENTANYL CITRATE 50 UG/ML
INJECTION, SOLUTION INTRAMUSCULAR; INTRAVENOUS
Status: DISPENSED
Start: 2020-02-04

## (undated) RX ORDER — PHENYLEPHRINE HCL IN 0.9% NACL 1 MG/10 ML
SYRINGE (ML) INTRAVENOUS
Status: DISPENSED
Start: 2020-02-04

## (undated) RX ORDER — ONDANSETRON 2 MG/ML
INJECTION INTRAMUSCULAR; INTRAVENOUS
Status: DISPENSED
Start: 2020-02-04